# Patient Record
Sex: MALE | Race: WHITE | Employment: FULL TIME | URBAN - METROPOLITAN AREA
[De-identification: names, ages, dates, MRNs, and addresses within clinical notes are randomized per-mention and may not be internally consistent; named-entity substitution may affect disease eponyms.]

---

## 2017-09-14 ENCOUNTER — OFFICE VISIT (OUTPATIENT)
Dept: FAMILY MEDICINE | Facility: CLINIC | Age: 52
End: 2017-09-14

## 2017-09-14 VITALS
BODY MASS INDEX: 27.47 KG/M2 | DIASTOLIC BLOOD PRESSURE: 70 MMHG | WEIGHT: 175 LBS | HEART RATE: 62 BPM | OXYGEN SATURATION: 98 % | SYSTOLIC BLOOD PRESSURE: 92 MMHG | TEMPERATURE: 98.9 F | HEIGHT: 67 IN

## 2017-09-14 DIAGNOSIS — Z13.220 ENCOUNTER FOR LIPID SCREENING FOR CARDIOVASCULAR DISEASE: ICD-10-CM

## 2017-09-14 DIAGNOSIS — Z00.00 ROUTINE GENERAL MEDICAL EXAMINATION AT A HEALTH CARE FACILITY: Primary | ICD-10-CM

## 2017-09-14 DIAGNOSIS — Z13.228 SCREENING FOR METABOLIC DISORDER: ICD-10-CM

## 2017-09-14 DIAGNOSIS — Z13.0 SCREENING, ANEMIA, DEFICIENCY, IRON: ICD-10-CM

## 2017-09-14 DIAGNOSIS — Z13.6 ENCOUNTER FOR LIPID SCREENING FOR CARDIOVASCULAR DISEASE: ICD-10-CM

## 2017-09-14 DIAGNOSIS — E56.9 VITAMIN DEFICIENCY: ICD-10-CM

## 2017-09-14 DIAGNOSIS — Z82.49 FHX: HEART DISEASE: ICD-10-CM

## 2017-09-14 DIAGNOSIS — D17.30 LIPOMA OF SKIN AND SUBCUTANEOUS TISSUE: ICD-10-CM

## 2017-09-14 DIAGNOSIS — Z23 VACCINE FOR VIRAL HEPATITIS: ICD-10-CM

## 2017-09-14 DIAGNOSIS — B07.8 COMMON WART: ICD-10-CM

## 2017-09-14 DIAGNOSIS — B07.0 PLANTAR WARTS: ICD-10-CM

## 2017-09-14 LAB
% GRANULOCYTES: 59.9 % (ref 42.2–75.2)
HCT VFR BLD AUTO: 45.4 % (ref 39–51)
HEMOGLOBIN: 15.5 G/DL (ref 13.4–17.5)
LYMPHOCYTES NFR BLD AUTO: 31.5 % (ref 20.5–51.1)
MCH RBC QN AUTO: 30.9 PG (ref 27–31)
MCHC RBC AUTO-ENTMCNC: 34.2 G/DL (ref 33–37)
MCV RBC AUTO: 90.3 FL (ref 80–100)
MONOCYTES NFR BLD AUTO: 8.6 % (ref 1.7–9.3)
PLATELET # BLD AUTO: 252 K/UL (ref 140–450)
RBC # BLD AUTO: 5.03 X10/CMM (ref 4.2–5.9)
WBC # BLD AUTO: 4.6 X10/CMM (ref 3.8–11)

## 2017-09-14 PROCEDURE — 36415 COLL VENOUS BLD VENIPUNCTURE: CPT | Performed by: FAMILY MEDICINE

## 2017-09-14 PROCEDURE — 86706 HEP B SURFACE ANTIBODY: CPT | Mod: 90 | Performed by: FAMILY MEDICINE

## 2017-09-14 PROCEDURE — 82306 VITAMIN D 25 HYDROXY: CPT | Mod: 90 | Performed by: FAMILY MEDICINE

## 2017-09-14 PROCEDURE — 99386 PREV VISIT NEW AGE 40-64: CPT | Mod: 25 | Performed by: FAMILY MEDICINE

## 2017-09-14 PROCEDURE — 86708 HEPATITIS A ANTIBODY: CPT | Mod: 90 | Performed by: FAMILY MEDICINE

## 2017-09-14 PROCEDURE — 80053 COMPREHEN METABOLIC PANEL: CPT | Mod: 90 | Performed by: FAMILY MEDICINE

## 2017-09-14 PROCEDURE — 17110 DESTRUCTION B9 LES UP TO 14: CPT | Performed by: FAMILY MEDICINE

## 2017-09-14 PROCEDURE — 85025 COMPLETE CBC W/AUTO DIFF WBC: CPT | Performed by: FAMILY MEDICINE

## 2017-09-14 PROCEDURE — 80061 LIPID PANEL: CPT | Mod: 90 | Performed by: FAMILY MEDICINE

## 2017-09-14 NOTE — LETTER
[facility]  [address] [place]  [address]  Phone: [phone]    September 18, 2017      Terrence Arrington  [address]  [facility] [address]              Dear Terrence,    The results from your recent visit showed   CMP/CBC were  ok   Lipids were slightly elevated   Vit D was fine   NOT IMMUNE TO HEPATITIS A/B consider vaccination series       Sincerely,     Kelsie Beasley M.D.    Results for orders placed or performed in visit on 09/14/17   Comp. Metabolic Panel (14) ([facility])   Result Value Ref Range    Glucose 88 65 - 99 mg/dL    Urea Nitrogen 13 6 - 24 mg/dL    Creatinine 0.76 0.76 - 1.27 mg/dL    eGFR If NonAfricn Am 104 >59 mL/min/1.73    eGFR If Africn Am 120 >59 mL/min/1.73    BUN/Creatinine Ratio 17 9 - 20    Sodium 143 134 - 144 mmol/L    Potassium 5.1 3.5 - 5.2 mmol/L    Chloride 100 96 - 106 mmol/L    Total CO2 23 18 - 28 mmol/L    Calcium 9.7 8.7 - 10.2 mg/dL    Protein Total 7.4 6.0 - 8.5 g/dL    Albumin 4.7 3.5 - 5.5 g/dL    Globulin, Total 2.7 1.5 - 4.5 g/dL    A/G Ratio 1.7 1.2 - 2.2    Bilirubin Total 0.8 0.0 - 1.2 mg/dL    Alkaline Phosphatase 43 39 - 117 IU/L    AST 26 0 - 40 IU/L    ALT 22 0 - 44 IU/L    Narrative    Performed at:  01 - [facility] [address], [place], [place]  [id number]  : Amadeo Trevino MD, Phone:  [phone]   Lipid Panel ([facility])   Result Value Ref Range    Cholesterol 239 (H) 100 - 199 mg/dL    Triglycerides 115 0 - 149 mg/dL    HDL Cholesterol 72 >39 mg/dL    VLDL Cholesterol Toney 23 5 - 40 mg/dL    LDL Cholesterol Calculated 144 (H) 0 - 99 mg/dL    LDL/HDL Ratio 2.0 0.0 - 3.6 ratio units    Narrative    Performed at:  01 - [facility] [address], [place], [place]  [id number]  : Amadeo Trevino MD, Phone:  [phone]   CBC with Diff/Plt ([facility])   Result Value Ref Range    WBC x10/cmm 4.6 3.8 - 11.0 x10/cmm    % Lymphocytes 31.5 20.5 - 51.1 %    % Monocytes 8.6 1.7 - 9.3 %    % Granulocytes 59.9 42.2 - 75.2 %     RBC x10/cmm 5.03 4.2 - 5.9 x10/cmm    Hemoglobin 15.5 13.4 - 17.5 g/dl    Hematocrit 45.4 39 - 51 %    MCV 90.3 80 - 100 fL    MCH 30.9 27.0 - 31.0 pg    MCHC 34.2 33.0 - 37.0 g/dL    Platelet Count 252 140 - 450 K/uL   Vitamin D  25-Hydroxy ([facility])   Result Value Ref Range    Vitamin D,25-Hydroxy 53.5 30.0 - 100.0 ng/mL    Narrative    Performed at:  01 - [facility] [address], [place], [place]  [id number]  : Amadeo Trevino MD, Phone:  [phone]   Hep A Ab  Total ([facility])   Result Value Ref Range    Hep A Ab, Total Negative Negative    Narrative    Performed at:  01 - [facility] [address], [place], [place]  [id number]  : Amadeo Trevino MD, Phone:  [phone]   Hep B Surface Ab ([facility])   Result Value Ref Range    Hep B Surface Riya Qual Non Reactive     Narrative    Performed at:  01 - [facility] [address], [place], [place]  [id number]  : Amadeo Trevino MD, Phone:  [phone]

## 2017-09-14 NOTE — PATIENT INSTRUCTIONS
Preventive Health Recommendations  Male Ages 50   64    Yearly exam:             See your health care provider every year in order to  o   Review health changes.   o   Discuss preventive care.    o   Review your medicines if your doctor has prescribed any.     Have a cholesterol test every 5 years, or more frequently if you are at risk for high cholesterol/heart disease.     Have a diabetes test (fasting glucose) every three years. If you are at risk for diabetes, you should have this test more often.     Have a colonoscopy at age 50, or have a yearly FIT test (stool test). These exams will check for colon cancer.      Talk with your health care provider about whether or not a prostate cancer screening test (PSA) is right for you.    You should be tested each year for STDs (sexually transmitted diseases), if you re at risk.     Shots: Get a flu shot each year. Get a tetanus shot every 10 years.     Nutrition:    Eat at least 5 servings of fruits and vegetables daily.     Eat whole-grain bread, whole-wheat pasta and brown rice instead of white grains and rice.     Talk to your provider about Calcium and Vitamin D.     Lifestyle    Exercise for at least 150 minutes a week (30 minutes a day, 5 days a week). This will help you control your weight and prevent disease.     Limit alcohol to one drink per day.     No smoking.     Wear sunscreen to prevent skin cancer.     See your dentist every six months for an exam and cleaning.     See your eye doctor every 1 to 2 years.  Cryo for warts today    Eye exam update    You declined vaccine today    Labs today    Sign up for Alex

## 2017-09-14 NOTE — MR AVS SNAPSHOT
After Visit Summary   9/14/2017    Terrence Arrington    MRN: [id number]           Patient Information     Date Of Birth          1965        Visit Information        Provider Department      9/14/2017 9:15 AM Kelsie Beasley MD [facility]        Today's Diagnoses     Routine general medical examination at a health care facility    -  1    Screening for metabolic disorder        FHx: heart disease        Encounter for lipid screening for cardiovascular disease        Screening, anemia, deficiency, iron        Plantar warts        Common wart        Lipoma of skin and subcutaneous tissue        Vitamin deficiency        Vaccine for viral hepatitis          Care Instructions      Preventive Health Recommendations  Male Ages 50 - 64    Yearly exam:             See your health care provider every year in order to  o   Review health changes.   o   Discuss preventive care.    o   Review your medicines if your doctor has prescribed any.     Have a cholesterol test every 5 years, or more frequently if you are at risk for high cholesterol/heart disease.     Have a diabetes test (fasting glucose) every three years. If you are at risk for diabetes, you should have this test more often.     Have a colonoscopy at age 50, or have a yearly FIT test (stool test). These exams will check for colon cancer.      Talk with your health care provider about whether or not a prostate cancer screening test (PSA) is right for you.    You should be tested each year for STDs (sexually transmitted diseases), if you re at risk.     Shots: Get a flu shot each year. Get a tetanus shot every 10 years.     Nutrition:    Eat at least 5 servings of fruits and vegetables daily.     Eat whole-grain bread, whole-wheat pasta and brown rice instead of white grains and rice.     Talk to your provider about Calcium and Vitamin D.     Lifestyle    Exercise for at least 150 minutes a week (30 minutes a day, 5 days a week). This  "will help you control your weight and prevent disease.     Limit alcohol to one drink per day.     No smoking.     Wear sunscreen to prevent skin cancer.     See your dentist every six months for an exam and cleaning.     See your eye doctor every 1 to 2 years.  Cryo for warts today    Eye exam update    You declined vaccine today    Labs today    Sign up for Broadersheet          Follow-ups after your visit        Who to contact     If you have questions or need follow up information about today's clinic visit or your schedule please contact [facility] directly at [phone].  Normal or non-critical lab and imaging results will be communicated to you by T4 Mediahart, letter or phone within 4 business days after the clinic has received the results. If you do not hear from us within 7 days, please contact the clinic through iOculit or phone. If you have a critical or abnormal lab result, we will notify you by phone as soon as possible.  Submit refill requests through Broadersheet or call your pharmacy and they will forward the refill request to us. Please allow 3 business days for your refill to be completed.          Additional Information About Your Visit        MyCharSmarterphone Information     Broadersheet lets you send messages to your doctor, view your test results, renew your prescriptions, schedule appointments and more. To sign up, go to [email or web address] . Click on \"Log in\" on the left side of the screen, which will take you to the Welcome page. Then click on \"Sign up Now\" on the right side of the page.     You will be asked to enter the access code listed below, as well as some personal information. Please follow the directions to create your username and password.     Your access code is: [email or web address]  Expires: 2017 10:12 AM     Your access code will  in 90 days. If you need help or a new code, please call your [facility] or [phone].        Care EveryWhere ID     This is your Care EveryWhere " "ID. This could be used by other organizations to access your [facility] medical records  [id number]        Your Vitals Were     Pulse Temperature Height Pulse Oximetry BMI (Body Mass Index)       62 98.9  F (37.2  C) 1.695 m (5' 6.75\") 98% 27.61 kg/m2        Blood Pressure from Last 3 Encounters:   09/14/17 92/70    Weight from Last 3 Encounters:   09/14/17 79.4 kg (175 lb)              We Performed the Following     CBC with Diff/Plt ([facility])     Comp. Metabolic Panel (14) ([facility])     DESTRUCT BENIGN LESION, UP TO 14     Hep A Ab  Total ([facility])     Hep B Surface Ab ([facility])     Lipid Panel ([facility])     Vitamin D  25-Hydroxy ([facility])        Primary Care Provider Office Phone # Fax #    Kelsie Beasley [phone] [address] [facility] [facility] [address]        Equal Access to Services     [facility]: Hadii aad ku hadasho Soomaali, waaxda luqadaha, qaybta kaalmada adeegyada, waxay idiin hayaan jovon escalante . So [facility] [phone].    ATENCIÓN: Si habla español, tiene a calles disposición servicios gratuitos de asistencia lingüística. Llame al [phone].    We comply with applicable federal civil rights laws and [place] laws. We do not discriminate on the basis of race, color, national origin, age, disability sex, sexual orientation or gender identity.            Thank you!     Thank you for choosing [facility]  for your care. Our goal is always to provide you with excellent care. Hearing back from our patients is one way we can continue to improve our services. Please take a few minutes to complete the written survey that you may receive in the mail after your visit with us. Thank you!             Your Updated Medication List - Protect others around you: Learn how to safely use, store and throw away your medicines at www.disposemymeds.org.      Notice  As of 9/14/2017 10:12 AM    You have not been prescribed any medications.      "

## 2017-09-14 NOTE — PROGRESS NOTES
NEW PATIENT  SUBJECTIVE:   CC: Terrence Arrington is an 52 year old male who presents for preventative health visit.     Healthy Habits:    Do you get at least three servings of calcium containing foods daily (dairy, green leafy vegetables, etc.)? yes    Amount of exercise or daily activities, outside of work: 6 day(s) per week    Problems taking medications regularly not applicable    Medication side effects: No    Have you had an eye exam in the past two years? no    Do you see a dentist twice per year? yes    Do you have sleep apnea, excessive snoring or daytime drowsiness?no    New patient establish care  Previous care site: Colonoscopy 9 years ago [address]. Negative.   Last Td was on 8/4/2009     Terrence Arrington is a 52 year old male who is here to establish care and physical.  White male with glasses    How did you hear about our clinic? Insurance    Job /serve   female  No kid  2 dogs one cat  Hobbies: banjo, training for ninja warrior, reading, walking the dogs. Cut down on TV    Smoker rare cigar, quit chewing tobacco 3 years ago  ETOH 12 pack/week beer. 5/setting. (reviewed 3/day recommendation)  Street drug/MJ : MJ one a month  Caffeine: coffee 1/2 pot 12 cups, no soda    Sleep fine 6-8 hours, Nocturia 1  Appetite fine  Exercise as above    Travel: no plans  Exposures: no    Eye 3 years ago  Dental UTD 4 months ago    Concerns today: none        MENTAL STATUS EXAM:  Appearance: Appears stated age, dressed and groomed appropriately for the visit today.  Attitude: cooperative  Behavior: normal  Eye Contact: normal  Speech: normal  Orientation: oriented to person , place, time and situation  Mood:  Admits no sadness and anxiety most of time  Affect: Mood Congruent  Thought Process: clear  Suicidal Ideation: reports no thoughts, no intention  Hallucination: no  Paranoid-no  Manic-no  Panic-no  OCD - no  Gambling - no        Recent falls - no  Recent blackouts - no  Seizures - no        Any  ER/UC or hospital visits within the last 2 weeks? - no        ADLs:    Self care: independent all  Toileting -   Bathing -   Hair-   Teeth-   Feeding-     Housekeeping:independent  Grocery shopping-   Vacuuming-   Picking up-   Laundry-     Transportation:  Walk- ok  Bike- ok sometimes helmet  Bus- no  Drive- yes  Accidents- no    Sit- no issues  Stand- no issues  Walk- no issues  Lying- no issues      Vaccines:   Adacel- 2009  Flu- no, declined  Pneumovax- no declined  Gardisil- NA  Hep A/B- no. declined  Chicken pox- had disease  Zostavax (age 60 and over)- NA                          CONCERNS  Spot/Lump mid right side of back- lipoma    Bump on back  Left finger wart  Right foot warts  Hemorrhoids  Right great toenail.    Today's PHQ-2 Score: PHQ-2 ( 1999 Pfizer) 9/14/2017   Q1: Little interest or pleasure in doing things 0   Q2: Feeling down, depressed or hopeless 0   PHQ-2 Score 0         Abuse: Current or Past(Physical, Sexual or Emotional)- No  Do you feel safe in your environment - Yes  Social History   Substance Use Topics     Smoking status: Current Some Day Smoker     Smokeless tobacco: Never Used      Comment: Occasionally weed and cigars      Alcohol use Yes     The patient does not drink >3 drinks per day nor >7 drinks per week.    Last PSA: No results found for: PSA    Reviewed orders with patient. Reviewed health maintenance and updated orders accordingly - Yes  Labs reviewed in EPIC  BP Readings from Last 3 Encounters:   09/14/17 92/70    Wt Readings from Last 3 Encounters:   09/14/17 79.4 kg (175 lb)                  Patient Active Problem List   Diagnosis     Plantar warts     Common wart     Lipoma of skin and subcutaneous tissue     Past Surgical History:   Procedure Laterality Date     APPENDECTOMY       HERNIORRHAPHY INGUINAL CHILD BILATERAL       LASIK BILATERAL Bilateral      VASECTOMY         Social History   Substance Use Topics     Smoking status: Current Some Day Smoker     Types: Cigars  "    Smokeless tobacco: Never Used      Comment: Occasionally weed and cigars      Alcohol use Yes     Family History   Problem Relation Age of Onset     Leukemia Mother 56     Other - See Comments Father 90     Cardiac stents, Smoker     CANCER Maternal Grandmother      Dementia Paternal Grandmother 80         No current outpatient prescriptions on file.     No Known Allergies  Recent Labs   Lab Test  09/14/17   1025   LDL  144*   HDL  72   TRIG  115   ALT  22   CR  0.76   POTASSIUM  5.1              Reviewed and updated as needed this visit by clinical staffTobacco  Allergies  Meds  Med Hx  Surg Hx  Soc Hx        Reviewed and updated as needed this visit by Provider        Past Medical History:   Diagnosis Date     Fracture of right foot     x2      Past Surgical History:   Procedure Laterality Date     APPENDECTOMY       HERNIORRHAPHY INGUINAL CHILD BILATERAL       LASIK BILATERAL Bilateral      VASECTOMY         ROS:  C: NEGATIVE for fever, chills, change in weight  I: NEGATIVE for worrisome rashes, moles or lesions  E: NEGATIVE for vision changes or irritation  ENT: NEGATIVE for ear, mouth and throat problems  R: NEGATIVE for significant cough or SOB  CV: NEGATIVE for chest pain, palpitations or peripheral edema  GI: NEGATIVE for nausea, abdominal pain, heartburn, or change in bowel habits   male: negative for dysuria, hematuria, decreased urinary stream, erectile dysfunction, urethral discharge  M: NEGATIVE for significant arthralgias or myalgia  N: NEGATIVE for weakness, dizziness or paresthesias  P: NEGATIVE for changes in mood or affect    OBJECTIVE:   Ht 1.695 m (5' 6.75\")  Wt 79.4 kg (175 lb)  BMI 27.61 kg/m2  EXAM:  GENERAL: healthy, alert and no distress  EYES: Eyes grossly normal to inspection, PERRL and conjunctivae and sclerae normal  HENT: ear canals and TM's normal, nose and mouth without ulcers or lesions  NECK: no adenopathy, no asymmetry, masses, or scars and thyroid normal to " palpation  RESP: lungs clear to auscultation - no rales, rhonchi or wheezes  CV: regular rate and rhythm, normal S1 S2, no S3 or S4, no murmur, click or rub, no peripheral edema and peripheral pulses strong  ABDOMEN: soft, nontender, no hepatosplenomegaly, no masses and bowel sounds normal   (male): normal male genitalia without lesions or urethral discharge, no hernia  RECTAL: normal sphincter tone, no rectal masses, prostate normal size, smooth, nontender without nodules or masses  MS: no gross musculoskeletal defects noted, no edema  SKIN: no suspicious lesions or rashes Lipoma and warts as below  NEURO: Normal strength and tone, mentation intact and speech normal  PSYCH: mentation appears normal, affect normal/bright  LYMPH: no cervical, supraclavicular, axillary, or inguinal adenopathy  Warts left index and right plantar- cryo done at his request  Right great toe nail (cinder block feel on it) healing    ASSESSMENT/PLAN:       ICD-10-CM    1. Routine general medical examination at a health care facility Z00.00    2. Screening for metabolic disorder Z13.228 Comp. Metabolic Panel (14) ([facility])   3. FHx: heart disease Z82.49    4. Encounter for lipid screening for cardiovascular disease Z13.220 Lipid Panel ([facility])    Z13.6    5. Screening, anemia, deficiency, iron Z13.0 CBC with Diff/Plt ([facility])   6. Plantar warts B07.0 DESTRUCT BENIGN LESION, UP TO 14   7. Common wart B07.8 DESTRUCT BENIGN LESION, UP TO 14   8. Lipoma of skin and subcutaneous tissue D17.30    9. Vitamin deficiency E56.9 Vitamin D  25-Hydroxy ([facility])   10. Vaccine for viral hepatitis Z23 Hep A Ab  Total ([facility])     Hep B Surface Ab ([facility])       COUNSELING:  Reviewed preventive health counseling, as reflected in patient instructions       Consider AAA screening for ages 65-75 and smoking history       Regular exercise       Healthy diet/nutrition       Vision screening       Immunizations    Declined: Influenza, Pneumococcal and TDAP      "         One time pneumovax for smokers       Advance Care Planning           reports that he has been smoking.  He has never used smokeless tobacco.  Tobacco Cessation Action Plan: Information offered: Patient not interested at this time  Estimated body mass index is 27.61 kg/(m^2) as calculated from the following:    Height as of this encounter: 1.695 m (5' 6.75\").    Weight as of this encounter: 79.4 kg (175 lb).   Weight management plan: Discussed healthy diet and exercise guidelines and patient will follow up in 12 months in clinic to re-evaluate.    Counseling Resources:  ATP IV Guidelines  Pooled Cohorts Equation Calculator  FRAX Risk Assessment  ICSI Preventive Guidelines  Dietary Guidelines for Americans, 2010  USDA's MyPlate  ASA Prophylaxis  Lung CA Screening    Kelsie Beasley MD  [facility]      "

## 2017-09-15 LAB
ALBUMIN SERPL-MCNC: 4.7 G/DL (ref 3.5–5.5)
ALBUMIN/GLOB SERPL: 1.7 {RATIO} (ref 1.2–2.2)
ALP SERPL-CCNC: 43 IU/L (ref 39–117)
ALT SERPL-CCNC: 22 IU/L (ref 0–44)
AST SERPL-CCNC: 26 IU/L (ref 0–40)
BILIRUB SERPL-MCNC: 0.8 MG/DL (ref 0–1.2)
BUN SERPL-MCNC: 13 MG/DL (ref 6–24)
BUN/CREATININE RATIO: 17 (ref 9–20)
CALCIUM SERPL-MCNC: 9.7 MG/DL (ref 8.7–10.2)
CHLORIDE SERPLBLD-SCNC: 100 MMOL/L (ref 96–106)
CHOLEST SERPL-MCNC: 239 MG/DL (ref 100–199)
CREAT SERPL-MCNC: 0.76 MG/DL (ref 0.76–1.27)
EGFR IF AFRICN AM: 120 ML/MIN/1.73
EGFR IF NONAFRICN AM: 104 ML/MIN/1.73
GLOBULIN, TOTAL: 2.7 G/DL (ref 1.5–4.5)
GLUCOSE SERPL-MCNC: 88 MG/DL (ref 65–99)
HDLC SERPL-MCNC: 72 MG/DL
HEP A AB, TOTAL: NEGATIVE
HEP B SURFACE ABY QUAL: NON REACTIVE
LDL/HDL RATIO: 2 RATIO UNITS (ref 0–3.6)
LDLC SERPL CALC-MCNC: 144 MG/DL (ref 0–99)
POTASSIUM SERPL-SCNC: 5.1 MMOL/L (ref 3.5–5.2)
PROT SERPL-MCNC: 7.4 G/DL (ref 6–8.5)
SODIUM SERPL-SCNC: 143 MMOL/L (ref 134–144)
TOTAL CO2: 23 MMOL/L (ref 18–28)
TRIGL SERPL-MCNC: 115 MG/DL (ref 0–149)
VITAMIN D, 25-HYDROXY: 53.5 NG/ML (ref 30–100)
VLDLC SERPL CALC-MCNC: 23 MG/DL (ref 5–40)

## 2017-09-19 PROBLEM — D17.30 LIPOMA OF SKIN AND SUBCUTANEOUS TISSUE: Status: ACTIVE | Noted: 2017-09-19

## 2017-09-19 PROBLEM — B07.8 COMMON WART: Status: ACTIVE | Noted: 2017-09-19

## 2017-09-19 PROBLEM — B07.0 PLANTAR WARTS: Status: ACTIVE | Noted: 2017-09-19

## 2018-02-01 ENCOUNTER — TELEPHONE (OUTPATIENT)
Dept: FAMILY MEDICINE | Facility: CLINIC | Age: 53
End: 2018-02-01

## 2018-02-01 NOTE — LETTER
[facility] [address] [place] [id number] [phone]      February 13, 2018      Terrence Arrington  [address]  [facility] [address]              Dear Terrence,      ACTION REQUIRED    You and I are failing at something critical, screening you for colon cancer!    Colon Cancer Screening- Recommended every 5-10 years, depending on your history, in order to prevent and detect colon cancer at its earliest stages.  Colon cancer is now the second leading cause of death in the [place] for both men and women and there are over 130,000 new cases and 50,000 deaths per year from colon cancer.  Colonoscopies can prevent 90-95% of these deaths.  Problem lesions can be removed before they ever become cancer.  This test is not only looking for cancer, but also getting rid of precancerious lesions.  You are usually given some sedation which makes the test very comfortable for most people.      If you do not wish to do a colonoscopy or cannot afford to do one, and you have no family members with colon cancer and you have had no previous polyps, at this time, there is another option. It is called a Cologuard test (take home stool sample kit).  It does need to be repeated every three years and if a positive result is obtained, you would be referred for a colonoscopy. The FIT test is really easy to do and does not require any  diet or medication restrictions and involves only one collection sample.      If you have completed either one of these tests or had a flexible sigmoidoscopy in the past five years at another facility, please have the records sent to our clinic so that we can best coordinate your care.  Please call us ([phone]) if you have questions or would like arrange either to do a colonoscopy or obtain the necessary test kit for the Cologuard test    Please contact our office to discuss your colon cancer screening options.    Thanks!  Sincerely,  Kelsie Beasley M.D.

## 2018-02-01 NOTE — TELEPHONE ENCOUNTER
Left message to call back [facility] [place] [facility]  February 1, 2018  ANTWON Mireles contact   For insurance coverage  Or questions on directions  Call [phone]  Marisa Cabello MA

## 2019-12-06 ENCOUNTER — OFFICE VISIT (OUTPATIENT)
Dept: FAMILY MEDICINE | Facility: CLINIC | Age: 54
End: 2019-12-06

## 2019-12-06 VITALS
WEIGHT: 180.6 LBS | OXYGEN SATURATION: 97 % | SYSTOLIC BLOOD PRESSURE: 150 MMHG | DIASTOLIC BLOOD PRESSURE: 88 MMHG | HEIGHT: 68 IN | RESPIRATION RATE: 16 BRPM | BODY MASS INDEX: 27.37 KG/M2 | HEART RATE: 71 BPM

## 2019-12-06 DIAGNOSIS — Z13.220 SCREENING FOR LIPOID DISORDERS: ICD-10-CM

## 2019-12-06 DIAGNOSIS — Z13.1 SCREENING FOR DIABETES MELLITUS: ICD-10-CM

## 2019-12-06 DIAGNOSIS — Z00.00 ROUTINE GENERAL MEDICAL EXAMINATION AT A HEALTH CARE FACILITY: Primary | ICD-10-CM

## 2019-12-06 PROCEDURE — 36415 COLL VENOUS BLD VENIPUNCTURE: CPT | Performed by: FAMILY MEDICINE

## 2019-12-06 PROCEDURE — 80053 COMPREHEN METABOLIC PANEL: CPT | Mod: 90 | Performed by: FAMILY MEDICINE

## 2019-12-06 PROCEDURE — 80061 LIPID PANEL: CPT | Mod: 90 | Performed by: FAMILY MEDICINE

## 2019-12-06 PROCEDURE — 99396 PREV VISIT EST AGE 40-64: CPT | Performed by: FAMILY MEDICINE

## 2019-12-06 ASSESSMENT — MIFFLIN-ST. JEOR: SCORE: 1624.73

## 2019-12-06 NOTE — LETTER
[facility] [address] [place]  [address]  Phone: [phone]    December 10, 2019      Terrence Arrington  [address]  [facility] [address]              Dear Terrence,    The results from your recent visit showed improved cholesterol results and reassuring labs.        Sincerely,     Sammy Sanderson M.D.    Results for orders placed or performed in visit on 12/06/19   Lipid Panel ([facility])     Status: Abnormal   Result Value Ref Range    Cholesterol 209 (H) 100 - 199 mg/dL    Triglycerides 98 0 - 149 mg/dL    HDL Cholesterol 64 >39 mg/dL    VLDL Cholesterol Toney 20 5 - 40 mg/dL    LDL Cholesterol Calculated 125 (H) 0 - 99 mg/dL    LDL/HDL Ratio 2.0 0.0 - 3.6 ratio   Comp. Metabolic Panel (14) ([facility])     Status: Abnormal   Result Value Ref Range    Glucose 97 65 - 99 mg/dL    Urea Nitrogen 11 6 - 24 mg/dL    Creatinine 0.65 (L) 0.76 - 1.27 mg/dL    eGFR If NonAfricn Am 110 >59 mL/min/1.73    eGFR If Africn Am 127 >59 mL/min/1.73    BUN/Creatinine Ratio 17 9 - 20    Sodium 141 134 - 144 mmol/L    Potassium 4.4 3.5 - 5.2 mmol/L    Chloride 105 96 - 106 mmol/L    Total CO2 25 20 - 29 mmol/L    Calcium 9.4 8.7 - 10.2 mg/dL    Protein Total 6.7 6.0 - 8.5 g/dL    Albumin 4.5 3.5 - 5.5 g/dL    Globulin, Total 2.2 1.5 - 4.5 g/dL    A/G Ratio 2.0 1.2 - 2.2    Bilirubin Total 0.6 0.0 - 1.2 mg/dL    Alkaline Phosphatase 39 39 - 117 IU/L    AST 22 0 - 40 IU/L    ALT 22 0 - 44 IU/L

## 2019-12-06 NOTE — PROGRESS NOTES
3  SUBJECTIVE:   CC: Terrence Arrington is an 54 year old male who presents for preventive health visit.     Healthy Habits:    Do you get at least three servings of calcium containing foods daily (dairy, green leafy vegetables, etc.)? yes    Amount of exercise or daily activities, outside of work: 6 day(s) per week    Problems taking medications regularly No    Medication side effects: No    Have you had an eye exam in the past two years? yes    Do you see a dentist twice per year?no-once a year    Do you have sleep apnea, excessive snoring or daytime drowsiness?no    Pass hearing test for both ears    Today's PHQ-2 Score:   PHQ-2 ( 1999 Pfizer) 9/14/2017   Q1: Little interest or pleasure in doing things 0   Q2: Feeling down, depressed or hopeless 0   PHQ-2 Score 0       Abuse: Current or Past(Physical, Sexual or Emotional)- No  Do you feel safe in your environment? Yes    Have you ever done Advance Care Planning? (For example, a Health Directive, POLST, or a discussion with a medical provider or your loved ones about your wishes): no    Social History     Tobacco Use     Smoking status: Current Some Day Smoker     Types: Cigars     Smokeless tobacco: Never Used     Tobacco comment: Occasionally weed and cigars    Substance Use Topics     Alcohol use: Yes     If you drink alcohol do you typically have >3 drinks per day or >7 drinks per week? No                      Last PSA: No results found for: PSA    Reviewed orders with patient. Reviewed health maintenance and updated orders accordingly - Yes  Lab work is in process    Reviewed and updated as needed this visit by clinical staff  Tobacco  Allergies  Meds         Reviewed and updated as needed this visit by Provider            ROS:  CONSTITUTIONAL: NEGATIVE for fever, chills, change in weight  INTEGUMENTARY/SKIN: NEGATIVE for worrisome rashes, moles or lesions  EYES: NEGATIVE for vision changes or irritation  ENT: NEGATIVE for ear, mouth and throat  "problems  RESP: NEGATIVE for significant cough or SOB  CV: NEGATIVE for chest pain, palpitations or peripheral edema  GI: NEGATIVE for nausea, abdominal pain, heartburn, or change in bowel habits   male: negative for dysuria, hematuria, decreased urinary stream, erectile dysfunction, urethral discharge  MUSCULOSKELETAL: NEGATIVE for significant arthralgias or myalgia  NEURO: NEGATIVE for weakness, dizziness or paresthesias  PSYCHIATRIC: NEGATIVE for changes in mood or affect    OBJECTIVE:   BP (!) 150/88   Pulse 71   Resp 16   Ht 1.721 m (5' 7.75\")   Wt 81.9 kg (180 lb 9.6 oz)   SpO2 97%   BMI 27.66 kg/m    EXAM:  GENERAL: healthy, alert and no distress  EYES: Eyes grossly normal to inspection, PERRL and conjunctivae and sclerae normal  HENT: ear canals and TM's normal, nose and mouth without ulcers or lesions  NECK: no adenopathy, no asymmetry, masses, or scars and thyroid normal to palpation  RESP: lungs clear to auscultation - no rales, rhonchi or wheezes  CV: regular rate and rhythm, normal S1 S2, no S3 or S4, no murmur, click or rub, no peripheral edema and peripheral pulses strong  ABDOMEN: soft, nontender, no hepatosplenomegaly, no masses and bowel sounds normal  MS: no gross musculoskeletal defects noted, no edema  SKIN: no suspicious lesions or rashes  NEURO: Normal strength and tone, mentation intact and speech normal  PSYCH: mentation appears normal, affect normal/bright    Diagnostic Test Results:  Labs reviewed in Epic    ASSESSMENT/PLAN:       ICD-10-CM    1. Routine general medical examination at a health care facility Z00.00    2. Screening for lipoid disorders Z13.220 Lipid Panel ([facility])   3. Screening for diabetes mellitus Z13.1 Comp. Metabolic Panel (14) ([facility])       COUNSELING:  Reviewed preventive health counseling, as reflected in patient instructions  Special attention given to:        Regular exercise       Healthy diet/nutrition       Vision screening       Hearing " "screening    Estimated body mass index is 27.61 kg/m  as calculated from the following:    Height as of 9/14/17: 1.695 m (5' 6.75\").    Weight as of 9/14/17: 79.4 kg (175 lb).     reports that he has been smoking cigars. He has never used smokeless tobacco.    Pt will monitor BP at home closely and let us know if over 140 consitently    Counseling Resources:  ATP IV Guidelines  Pooled Cohorts Equation Calculator  FRAX Risk Assessment  ICSI Preventive Guidelines  Dietary Guidelines for Americans, 2010  USDA's MyPlate  ASA Prophylaxis  Lung CA Screening    Sammy Sanderson MD  [facility]  "

## 2019-12-07 LAB
ALBUMIN SERPL-MCNC: 4.5 G/DL (ref 3.5–5.5)
ALBUMIN/GLOB SERPL: 2 {RATIO} (ref 1.2–2.2)
ALP SERPL-CCNC: 39 IU/L (ref 39–117)
ALT SERPL-CCNC: 22 IU/L (ref 0–44)
AST SERPL-CCNC: 22 IU/L (ref 0–40)
BILIRUB SERPL-MCNC: 0.6 MG/DL (ref 0–1.2)
BUN SERPL-MCNC: 11 MG/DL (ref 6–24)
BUN/CREATININE RATIO: 17 (ref 9–20)
CALCIUM SERPL-MCNC: 9.4 MG/DL (ref 8.7–10.2)
CHLORIDE SERPLBLD-SCNC: 105 MMOL/L (ref 96–106)
CHOLEST SERPL-MCNC: 209 MG/DL (ref 100–199)
CREAT SERPL-MCNC: 0.65 MG/DL (ref 0.76–1.27)
EGFR IF AFRICN AM: 127 ML/MIN/1.73
EGFR IF NONAFRICN AM: 110 ML/MIN/1.73
GLOBULIN, TOTAL: 2.2 G/DL (ref 1.5–4.5)
GLUCOSE SERPL-MCNC: 97 MG/DL (ref 65–99)
HDLC SERPL-MCNC: 64 MG/DL
LDL/HDL RATIO: 2 RATIO (ref 0–3.6)
LDLC SERPL CALC-MCNC: 125 MG/DL (ref 0–99)
POTASSIUM SERPL-SCNC: 4.4 MMOL/L (ref 3.5–5.2)
PROT SERPL-MCNC: 6.7 G/DL (ref 6–8.5)
SODIUM SERPL-SCNC: 141 MMOL/L (ref 134–144)
TOTAL CO2: 25 MMOL/L (ref 20–29)
TRIGL SERPL-MCNC: 98 MG/DL (ref 0–149)
VLDLC SERPL CALC-MCNC: 20 MG/DL (ref 5–40)

## 2019-12-24 DIAGNOSIS — Z12.11 SPECIAL SCREENING FOR MALIGNANT NEOPLASMS, COLON: Primary | ICD-10-CM

## 2021-06-08 NOTE — PROGRESS NOTES
SUBJECTIVE:   CC: Terrence Arringotn is an 56 year old male who presents for preventive health visit.     Patient has been advised of split billing requirements and indicates understanding: Yes     He is very active.  No concerns.    Healthy Habits:    Do you get at least three servings of calcium containing foods daily (dairy, green leafy vegetables, etc.)? no, taking calcium and/or vitamin D supplement: yes     Amount of exercise or daily activities, outside of work: 6 day(s) per week    Problems taking medications regularly No    Medication side effects: No    Have you had an eye exam in the past two years? No     Do you see a dentist twice per year? No, once per year.    Do you have sleep apnea, excessive snoring or daytime drowsiness? No     Hearing Screening:   Right Ear  4000Hz: pass  2000Hz: pass  1000Hz: pass  500Hz: pass    Left Ear  4000Hz: pass  2000Hz: pass  1000Hz: pass  500Hz: pass        -------------------------------------    Today's PHQ-2 Score:   PHQ-2 ( 1999 Pfizer) 6/9/2021 12/6/2019   Q1: Little interest or pleasure in doing things 0 0   Q2: Feeling down, depressed or hopeless 0 0   PHQ-2 Score 0 0     Abuse: Current or Past(Physical, Sexual or Emotional)- NO  Do you feel safe in your environment? NO    Have you ever done Advance Care Planning? (For example, a Health Directive, POLST, or a discussion with a medical provider or your loved ones about your wishes): No, advance care planning information given to patient to review.  Patient plans to discuss their wishes with loved ones or provider.      Social History     Tobacco Use     Smoking status: Current Some Day Smoker     Types: Cigars     Smokeless tobacco: Never Used     Tobacco comment: Occasionally weed and cigars    Substance Use Topics     Alcohol use: Yes     Frequency: 2-4 times a month     Drinks per session: 3 or 4     Comment: Mostly socially     If you drink alcohol do you typically have >3 drinks per day or >7 drinks per  "week? No                      Last PSA: No results found for: PSA    Reviewed orders with patient. Reviewed health maintenance and updated orders accordingly - Yes  Lab work is in process    Reviewed and updated as needed this visit by clinical staff  Tobacco  Allergies  Meds              Reviewed and updated as needed this visit by Provider                Past Medical History:   Diagnosis Date     Fracture of right foot     x2      Past Surgical History:   Procedure Laterality Date     APPENDECTOMY       HERNIORRHAPHY INGUINAL CHILD BILATERAL       LASIK BILATERAL Bilateral      VASECTOMY         ROS:  CONSTITUTIONAL: NEGATIVE for fever, chills, change in weight  INTEGUMENTARY/SKIN: NEGATIVE for worrisome rashes, moles or lesions  EYES: NEGATIVE for vision changes or irritation  ENT: NEGATIVE for ear, mouth and throat problems  RESP: NEGATIVE for significant cough or SOB  CV: NEGATIVE for chest pain, palpitations or peripheral edema  GI: NEGATIVE for nausea, abdominal pain, heartburn, or change in bowel habits   male: negative for dysuria, hematuria, decreased urinary stream, erectile dysfunction, urethral discharge  MUSCULOSKELETAL: NEGATIVE for significant arthralgias or myalgia  NEURO: NEGATIVE for weakness, dizziness or paresthesias  PSYCHIATRIC: NEGATIVE for changes in mood or affect    OBJECTIVE:   /61   Pulse 57   Ht 1.695 m (5' 6.75\")   Wt 80.7 kg (178 lb)   SpO2 98%   BMI 28.09 kg/m    EXAM:  GENERAL: healthy, alert and no distress  EYES: Eyes grossly normal to inspection, PERRL and conjunctivae and sclerae normal  HENT: ear canals and TM's normal, nose and mouth without ulcers or lesions  NECK: no adenopathy, no asymmetry, masses, or scars and thyroid normal to palpation  RESP: lungs clear to auscultation - no rales, rhonchi or wheezes  CV: regular rate and rhythm, normal S1 S2, no S3 or S4, no murmur, click or rub, no peripheral edema and peripheral pulses strong  ABDOMEN: soft, " "nontender, no hepatosplenomegaly, no masses and bowel sounds normal  RECTAL: normal sphincter tone, no rectal masses, prostate normal size, smooth, nontender without nodules or masses  MS: no gross musculoskeletal defects noted, no edema  SKIN: no suspicious lesions or rashes  NEURO: Normal strength and tone, mentation intact and speech normal  PSYCH: mentation appears normal, affect normal/bright    Diagnostic Test Results:  Labs reviewed in Epic    ASSESSMENT/PLAN:   1. Need for vaccination  - TD PRESERV FREE, IM (7+ YRS)  - VACCINE ADMINISTRATION, INITIAL    2. Routine general medical examination at a health care facility  - discussed preventative guidelines, healthy diet, exercise and weight management    3. Screening for lipoid disorders  - Lipid Panel ([facility])    4. Encounter for screening for metabolic disorder  - Comp. Metabolic Panel (14) ([facility])    5. Encounter for screening for hematologic disorder  - CBC with Diff/Plt ([facility])    6. Need for hepatitis C screening test  - HCV Antibody ([facility])    Patient has been advised of split billing requirements and indicates understanding: Yes  COUNSELING:  Reviewed preventive health counseling, as reflected in patient instructions    Estimated body mass index is 28.09 kg/m  as calculated from the following:    Height as of this encounter: 1.695 m (5' 6.75\").    Weight as of this encounter: 80.7 kg (178 lb).        He reports that he has been smoking cigars. He has never used smokeless tobacco.  Tobacco Cessation Action Plan:   n/a      Counseling Resources:  ATP IV Guidelines  Pooled Cohorts Equation Calculator  FRAX Risk Assessment  ICSI Preventive Guidelines  Dietary Guidelines for Americans, 2010  USDA's MyPlate  ASA Prophylaxis  Lung CA Screening    John Lee MD  [facility]  "

## 2021-06-08 NOTE — PATIENT INSTRUCTIONS
Preventive Health Recommendations  Male Ages 50 - 64    Yearly exam:             See your health care provider every year in order to  o   Review health changes.   o   Discuss preventive care.    o   Review your medicines if your doctor has prescribed any.     Have a cholesterol test every 5 years, or more frequently if you are at risk for high cholesterol/heart disease.     Have a diabetes test (fasting glucose) every three years. If you are at risk for diabetes, you should have this test more often.     Have a colonoscopy at age 50, or have a yearly FIT test (stool test). These exams will check for colon cancer.      Talk with your health care provider about whether or not a prostate cancer screening test (PSA) is right for you.    You should be tested each year for STDs (sexually transmitted diseases), if you re at risk.     Shots: Get a flu shot each year. Get a tetanus shot every 10 years.     Nutrition:    Eat at least 5 servings of fruits and vegetables daily.     Eat whole-grain bread, whole-wheat pasta and brown rice instead of white grains and rice.     Get adequate Calcium and Vitamin D.     Lifestyle    Exercise for at least 150 minutes a week (30 minutes a day, 5 days a week). This will help you control your weight and prevent disease.     Limit alcohol to one drink per day.     No smoking.     Wear sunscreen to prevent skin cancer.     See your dentist every six months for an exam and cleaning.     See your eye doctor every 1 to 2 years.    Preventive Health Recommendations  Male Ages 50 - 64    Yearly exam:             See your health care provider every year in order to  o   Review health changes.   o   Discuss preventive care.    o   Review your medicines if your doctor has prescribed any.     Have a cholesterol test every 5 years, or more frequently if you are at risk for high cholesterol/heart disease.     Have a diabetes test (fasting glucose) every three years. If you are at risk for diabetes,  you should have this test more often.     Have a colonoscopy at age 50, or have a yearly FIT test (stool test). These exams will check for colon cancer.      Talk with your health care provider about whether or not a prostate cancer screening test (PSA) is right for you.    You should be tested each year for STDs (sexually transmitted diseases), if you re at risk.     Shots: Get a flu shot each year. Get a tetanus shot every 10 years.     Nutrition:    Eat at least 5 servings of fruits and vegetables daily.     Eat whole-grain bread, whole-wheat pasta and brown rice instead of white grains and rice.     Get adequate Calcium and Vitamin D.     Lifestyle    Exercise for at least 150 minutes a week (30 minutes a day, 5 days a week). This will help you control your weight and prevent disease.     Limit alcohol to one drink per day.     No smoking.     Wear sunscreen to prevent skin cancer.     See your dentist every six months for an exam and cleaning.     See your eye doctor every 1 to 2 years.  Thank you for coming in today! If you receive a survey via My Chart, mail or phone please let us know if there was anything you especially appreciated today or if there is any way we can improve our clinic. We value your input.     Likewise, we are working hard to attend to our digital reputation.    Please consider reviewing our clinic on [employer] and/or [employer] via the following links or QR codes:    [email or web address]                                          We truly appreciate you taking the time to do this.    General Information:    Today you had your appointment with John Lee MD  My hours are:    Monday 8 AM - 5 PM  Tuesday: 8 AM - 5 PM  Wednesday: 8 AM - 5 PM  Fridays: 8 AM - 5 PM    I am not in the office Thursdays. Therefore, non urgent calls received on Thursday will be addressed when I am back in the office on  Friday.    If lab work was done today as part of your evaluation you will generally be contacted via My Chart, mail, or phone with the results within 1-5 days. If there is an alarming result we will contact you by phone. Lab results come back at varying times, I generally wait until all labs are resulted before making comments on results. Please note labs are automatically released to My Chart once available.    If you need refills please contact your pharmacy. They will send a refill request to me to review. Please allow 3 business days for us to process all refill requests.    Please call or send a medical message with any questions or concerns.    Thank you for choosing [facility].  We truly appreciate you trusting us with your medical care.    Sincerely,    John Lee MD

## 2021-06-09 ENCOUNTER — OFFICE VISIT (OUTPATIENT)
Dept: FAMILY MEDICINE | Facility: CLINIC | Age: 56
End: 2021-06-09

## 2021-06-09 VITALS
HEART RATE: 57 BPM | WEIGHT: 178 LBS | HEIGHT: 67 IN | DIASTOLIC BLOOD PRESSURE: 61 MMHG | BODY MASS INDEX: 27.94 KG/M2 | OXYGEN SATURATION: 98 % | SYSTOLIC BLOOD PRESSURE: 115 MMHG

## 2021-06-09 DIAGNOSIS — Z13.228 ENCOUNTER FOR SCREENING FOR METABOLIC DISORDER: ICD-10-CM

## 2021-06-09 DIAGNOSIS — Z00.00 ROUTINE GENERAL MEDICAL EXAMINATION AT A HEALTH CARE FACILITY: Primary | ICD-10-CM

## 2021-06-09 DIAGNOSIS — Z13.0 ENCOUNTER FOR SCREENING FOR HEMATOLOGIC DISORDER: ICD-10-CM

## 2021-06-09 DIAGNOSIS — Z13.220 SCREENING FOR LIPOID DISORDERS: ICD-10-CM

## 2021-06-09 DIAGNOSIS — Z11.59 NEED FOR HEPATITIS C SCREENING TEST: ICD-10-CM

## 2021-06-09 DIAGNOSIS — Z23 NEED FOR VACCINATION: ICD-10-CM

## 2021-06-09 LAB
% GRANULOCYTES: 59.1 % (ref 42.2–75.2)
HCT VFR BLD AUTO: 42.1 % (ref 39–51)
HEMOGLOBIN: 14.8 G/DL (ref 13.4–17.5)
LYMPHOCYTES NFR BLD AUTO: 32.3 % (ref 20.5–51.1)
MCH RBC QN AUTO: 31 PG (ref 27–31)
MCHC RBC AUTO-ENTMCNC: 35.1 G/DL (ref 33–37)
MCV RBC AUTO: 88.2 FL (ref 80–100)
MONOCYTES NFR BLD AUTO: 8.6 % (ref 1.7–9.3)
PLATELET # BLD AUTO: 262 K/UL (ref 140–450)
RBC # BLD AUTO: 4.77 X10/CMM (ref 4.2–5.9)
WBC # BLD AUTO: 4.6 X10/CMM (ref 3.8–11)

## 2021-06-09 PROCEDURE — 36415 COLL VENOUS BLD VENIPUNCTURE: CPT | Performed by: FAMILY MEDICINE

## 2021-06-09 PROCEDURE — 85025 COMPLETE CBC W/AUTO DIFF WBC: CPT | Performed by: FAMILY MEDICINE

## 2021-06-09 PROCEDURE — 90714 TD VACC NO PRESV 7 YRS+ IM: CPT | Performed by: FAMILY MEDICINE

## 2021-06-09 PROCEDURE — 99396 PREV VISIT EST AGE 40-64: CPT | Mod: 25 | Performed by: FAMILY MEDICINE

## 2021-06-09 PROCEDURE — 90471 IMMUNIZATION ADMIN: CPT | Performed by: FAMILY MEDICINE

## 2021-06-09 SDOH — HEALTH STABILITY: MENTAL HEALTH: HOW OFTEN DO YOU HAVE 6 OR MORE DRINKS ON ONE OCCASION?: NOT ASKED

## 2021-06-09 SDOH — HEALTH STABILITY: MENTAL HEALTH: HOW MANY STANDARD DRINKS CONTAINING ALCOHOL DO YOU HAVE ON A TYPICAL DAY?: 3 OR 4

## 2021-06-09 SDOH — HEALTH STABILITY: MENTAL HEALTH: HOW OFTEN DO YOU HAVE A DRINK CONTAINING ALCOHOL?: 2-4 TIMES A MONTH

## 2021-06-09 ASSESSMENT — MIFFLIN-ST. JEOR: SCORE: 1587.06

## 2021-06-09 NOTE — LETTER
[facility]  [address] [place]  [address]  Phone: [phone]    June 14, 2021      Terrence Arrington  [address]  [place] [address]            Dear Terrence     Your recent lab results are within the expected range.  Everything looks great!     It was very nice seeing you.  If you have any questions, please contact our office.         Sincerely,     John Lee MD    Results for orders placed or performed in visit on 06/09/21   HCV Antibody ([facility])     Status: None   Result Value Ref Range    Hep C Virus Ab <0.1 0.0 - 0.9 s/co ratio    Narrative    Performed at:  01 - [facility] [place] [address], [place], [place]  [id number]  : Mark Swan MD, Phone:  [phone]   Lipid Panel ([facility])     Status: Abnormal   Result Value Ref Range    Cholesterol 172 100 - 199 mg/dL    Triglycerides 65 0 - 149 mg/dL    HDL Cholesterol 57 >39 mg/dL    VLDL Cholesterol Toney 13 5 - 40 mg/dL    LDL Cholesterol Calculated 102 (H) 0 - 99 mg/dL    LDL/HDL Ratio 1.8 0.0 - 3.6 ratio    Narrative    Performed at:  01 - [facility] [place] [address], [place], [place]  [id number]  : Mark Swan MD, Phone:  [phone]   Comp. Metabolic Panel (14) ([facility])     Status: Abnormal   Result Value Ref Range    Glucose 91 65 - 99 mg/dL    Urea Nitrogen 11 6 - 24 mg/dL    Creatinine 0.66 (L) 0.76 - 1.27 mg/dL    eGFR If NonAfricn Am 107 >59 mL/min/1.73    eGFR If Africn Am 124 >59 mL/min/1.73    BUN/Creatinine Ratio 17 9 - 20    Sodium 139 134 - 144 mmol/L    Potassium 4.5 3.5 - 5.2 mmol/L    Chloride 104 96 - 106 mmol/L    Total CO2 21 20 - 29 mmol/L    Calcium 9.0 8.7 - 10.2 mg/dL    Protein Total 6.5 6.0 - 8.5 g/dL    Albumin 3.9 3.8 - 4.9 g/dL    Globulin, Total 2.6 1.5 - 4.5 g/dL    A/G Ratio 1.5 1.2 - 2.2    Bilirubin Total 0.6 0.0 - 1.2 mg/dL    Alkaline Phosphatase 44 (L) 48 - 121 IU/L    AST 20 0 - 40 IU/L    ALT 19 0 - 44 IU/L    Narrative    Performed at:  01 - [facility]  [place] [address], [place], [place]  [id number]  : Mark Swan MD, Phone:  [phone]   CBC with Diff/Plt ([facility])     Status: None   Result Value Ref Range    WBC x10/cmm 4.6 3.8 - 11.0 x10/cmm    % Lymphocytes 32.3 20.5 - 51.1 %    % Monocytes 8.6 1.7 - 9.3 %    % Granulocytes 59.1 42.2 - 75.2 %    RBC x10/cmm 4.77 4.2 - 5.9 x10/cmm    Hemoglobin 14.8 13.4 - 17.5 g/dl    Hematocrit 42.1 39 - 51 %    MCV 88.2 80 - 100 fL    MCH 31.0 27.0 - 31.0 pg    MCHC 35.1 33.0 - 37.0 g/dL    Platelet Count 262 140 - 450 K/uL

## 2021-06-10 LAB
ALBUMIN SERPL-MCNC: 3.9 G/DL (ref 3.8–4.9)
ALBUMIN/GLOB SERPL: 1.5 {RATIO} (ref 1.2–2.2)
ALP SERPL-CCNC: 44 IU/L (ref 48–121)
ALT SERPL-CCNC: 19 IU/L (ref 0–44)
AST SERPL-CCNC: 20 IU/L (ref 0–40)
BILIRUB SERPL-MCNC: 0.6 MG/DL (ref 0–1.2)
BUN SERPL-MCNC: 11 MG/DL (ref 6–24)
BUN/CREATININE RATIO: 17 (ref 9–20)
CALCIUM SERPL-MCNC: 9 MG/DL (ref 8.7–10.2)
CHLORIDE SERPLBLD-SCNC: 104 MMOL/L (ref 96–106)
CHOLEST SERPL-MCNC: 172 MG/DL (ref 100–199)
CREAT SERPL-MCNC: 0.66 MG/DL (ref 0.76–1.27)
EGFR IF AFRICN AM: 124 ML/MIN/1.73
EGFR IF NONAFRICN AM: 107 ML/MIN/1.73
GLOBULIN, TOTAL: 2.6 G/DL (ref 1.5–4.5)
GLUCOSE SERPL-MCNC: 91 MG/DL (ref 65–99)
HCV AB SERPL QL IA: <0.1 S/CO RATIO (ref 0–0.9)
HDLC SERPL-MCNC: 57 MG/DL
LDL/HDL RATIO: 1.8 RATIO (ref 0–3.6)
LDLC SERPL CALC-MCNC: 102 MG/DL (ref 0–99)
POTASSIUM SERPL-SCNC: 4.5 MMOL/L (ref 3.5–5.2)
PROT SERPL-MCNC: 6.5 G/DL (ref 6–8.5)
SODIUM SERPL-SCNC: 139 MMOL/L (ref 134–144)
TOTAL CO2: 21 MMOL/L (ref 20–29)
TRIGL SERPL-MCNC: 65 MG/DL (ref 0–149)
VLDLC SERPL CALC-MCNC: 13 MG/DL (ref 5–40)

## 2021-10-04 ENCOUNTER — OFFICE VISIT (OUTPATIENT)
Dept: FAMILY MEDICINE | Facility: CLINIC | Age: 56
End: 2021-10-04

## 2021-10-04 VITALS
BODY MASS INDEX: 28.72 KG/M2 | HEART RATE: 58 BPM | TEMPERATURE: 97.5 F | OXYGEN SATURATION: 99 % | SYSTOLIC BLOOD PRESSURE: 124 MMHG | DIASTOLIC BLOOD PRESSURE: 64 MMHG | WEIGHT: 182 LBS

## 2021-10-04 DIAGNOSIS — R07.81 RIB PAIN: ICD-10-CM
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
## 2021-10-04 PROBLEM — B07.8 COMMON WART: Status: RESOLVED | Noted: 2017-09-19 | Resolved: 2021-10-04

## 2021-10-04 PROCEDURE — 99213 OFFICE O/P EST LOW 20 MIN: CPT | Performed by: NURSE PRACTITIONER

## 2021-10-04 NOTE — PROGRESS NOTES
Problem(s) Oriented visit        SUBJECTIVE:                                                    Terrence Arrington is a 56 year old male who presents to clinic today for the following health issues :    Fell about 12 days ago forward hitting left side of chest and rib cage. Was holding yeti mug in one hand and charcoal bin in in other so couldn't brace his fall. Had pain but no difficulty breathing. Pain persisted with good days and bad. Today pain is much better than it has been. Is a / and has still been working. Unable to lift barstools up/down though. Denies chest pressure, palpitations, shortness of breath, bruising.     Problem list, Medication list, Allergies, and Medical/Social/Surgical histories reviewed in [facility] and updated as appropriate.   Additional history: as documented    ROS:  4 point ROS completed and negative except noted above, including Gen, CV, Resp, MS    OBJECTIVE:                                                    /64   Pulse 58   Temp 97.5  F (36.4  C) (Skin)   Wt 82.6 kg (182 lb)   SpO2 99%   BMI 28.72 kg/m    Body mass index is 28.72 kg/m .   GENERAL: healthy, alert and no distress  RESP: lungs clear to auscultation - no rales, rhonchi or wheezes  CV: regular rates and rhythm, normal S1 S2, no S3 or S4 and no murmur, click or rub  MS: slight tenderness left anterior ribs  SKIN: no suspicious lesions or rashes  PSYCH: affect normal/bright     ASSESSMENT/PLAN:                                                      Terrence was seen today for fall.    Diagnoses and all orders for this visit:    Fall, initial encounter  Rib pain  Discussed rib injuries with the patient.  The acute phase of the pain typically takes a week or so to improve, then slowly gets better over 4-6 weeks.   Told the patient to be sure to hold a pillow tightly against their chest when coughing, it will help with the pain.   Recommended deep breathing regularly throughout the day at least every hour, to  help prevent atelectasis and pneumonias since people can preferentially inflate one side of the lungs vs the others.    Instructed the patient to call if they develop any fevers associated with cough and purulent sputum as these can be signs and symptoms of pneumonia.   NSAIDs, ice, rest as needed  X-ray if worsening      See Patient Instructions  Patient Instructions   Ibuprofen as needed for pain and inflammation  Apply ice as needed to cut down inflammation  Biofreeze is fine    Call if symptoms worsening again -> I will order rib/chest x-ray      CONNIE Lee CNP  [facility]  [phone]    For any issues my office # is [phone]

## 2021-10-04 NOTE — PATIENT INSTRUCTIONS
Ibuprofen as needed for pain and inflammation  Apply ice as needed to cut down inflammation  Biofreeze is fine    Call if symptoms worsening again -> I will order rib/chest x-ray

## 2023-06-03 ENCOUNTER — HOSPITAL ENCOUNTER (OUTPATIENT)
Facility: CLINIC | Age: 58
Setting detail: OBSERVATION
Discharge: HOME | End: 2023-06-04
Attending: INTERNAL MEDICINE | Admitting: INTERNAL MEDICINE
Payer: COMMERCIAL

## 2023-06-03 DIAGNOSIS — N20.0 NEPHROLITHIASIS: Primary | ICD-10-CM

## 2023-06-03 LAB — TSH SERPL DL<=0.005 MIU/L-ACNC: 2.66 UIU/ML (ref 0.3–4.2)

## 2023-06-03 PROCEDURE — 99223 1ST HOSP IP/OBS HIGH 75: CPT | Performed by: INTERNAL MEDICINE

## 2023-06-03 PROCEDURE — 96375 TX/PRO/DX INJ NEW DRUG ADDON: CPT

## 2023-06-03 PROCEDURE — G0378 HOSPITAL OBSERVATION PER HR: HCPCS

## 2023-06-03 PROCEDURE — 96376 TX/PRO/DX INJ SAME DRUG ADON: CPT

## 2023-06-03 PROCEDURE — 250N000011 HC RX IP 250 OP 636: Performed by: INTERNAL MEDICINE

## 2023-06-03 PROCEDURE — 87086 URINE CULTURE/COLONY COUNT: CPT | Performed by: INTERNAL MEDICINE

## 2023-06-03 PROCEDURE — 93005 ELECTROCARDIOGRAM TRACING: CPT

## 2023-06-03 PROCEDURE — 84443 ASSAY THYROID STIM HORMONE: CPT | Performed by: INTERNAL MEDICINE

## 2023-06-03 PROCEDURE — 258N000003 HC RX IP 258 OP 636: Performed by: INTERNAL MEDICINE

## 2023-06-03 PROCEDURE — 93010 ELECTROCARDIOGRAM REPORT: CPT | Performed by: INTERNAL MEDICINE

## 2023-06-03 PROCEDURE — 36415 COLL VENOUS BLD VENIPUNCTURE: CPT | Performed by: INTERNAL MEDICINE

## 2023-06-03 PROCEDURE — 96374 THER/PROPH/DIAG INJ IV PUSH: CPT

## 2023-06-03 RX ORDER — AMOXICILLIN 250 MG
2 CAPSULE ORAL 2 TIMES DAILY PRN
Status: DISCONTINUED | OUTPATIENT
Start: 2023-06-03 | End: 2023-06-04 | Stop reason: HOSPADM

## 2023-06-03 RX ORDER — TAMSULOSIN HYDROCHLORIDE 0.4 MG/1
0.4 CAPSULE ORAL DAILY
Status: DISCONTINUED | OUTPATIENT
Start: 2023-06-04 | End: 2023-06-04 | Stop reason: HOSPADM

## 2023-06-03 RX ORDER — ONDANSETRON 2 MG/ML
4 INJECTION INTRAMUSCULAR; INTRAVENOUS EVERY 6 HOURS PRN
Status: DISCONTINUED | OUTPATIENT
Start: 2023-06-03 | End: 2023-06-04 | Stop reason: HOSPADM

## 2023-06-03 RX ORDER — PROCHLORPERAZINE 25 MG
25 SUPPOSITORY, RECTAL RECTAL EVERY 12 HOURS PRN
Status: DISCONTINUED | OUTPATIENT
Start: 2023-06-03 | End: 2023-06-04 | Stop reason: HOSPADM

## 2023-06-03 RX ORDER — VITAMIN B COMPLEX
1 TABLET ORAL DAILY
COMMUNITY

## 2023-06-03 RX ORDER — SODIUM CHLORIDE 9 MG/ML
INJECTION, SOLUTION INTRAVENOUS CONTINUOUS
Status: DISCONTINUED | OUTPATIENT
Start: 2023-06-03 | End: 2023-06-04

## 2023-06-03 RX ORDER — HYDROMORPHONE HYDROCHLORIDE 1 MG/ML
0.3 INJECTION, SOLUTION INTRAMUSCULAR; INTRAVENOUS; SUBCUTANEOUS
Status: DISCONTINUED | OUTPATIENT
Start: 2023-06-03 | End: 2023-06-04 | Stop reason: HOSPADM

## 2023-06-03 RX ORDER — BISACODYL 10 MG
10 SUPPOSITORY, RECTAL RECTAL DAILY PRN
Status: DISCONTINUED | OUTPATIENT
Start: 2023-06-03 | End: 2023-06-04 | Stop reason: HOSPADM

## 2023-06-03 RX ORDER — NALOXONE HYDROCHLORIDE 0.4 MG/ML
0.4 INJECTION, SOLUTION INTRAMUSCULAR; INTRAVENOUS; SUBCUTANEOUS
Status: DISCONTINUED | OUTPATIENT
Start: 2023-06-03 | End: 2023-06-04 | Stop reason: HOSPADM

## 2023-06-03 RX ORDER — PROCHLORPERAZINE MALEATE 10 MG
10 TABLET ORAL EVERY 6 HOURS PRN
Status: DISCONTINUED | OUTPATIENT
Start: 2023-06-03 | End: 2023-06-04 | Stop reason: HOSPADM

## 2023-06-03 RX ORDER — AMOXICILLIN 250 MG
1 CAPSULE ORAL 2 TIMES DAILY PRN
Status: DISCONTINUED | OUTPATIENT
Start: 2023-06-03 | End: 2023-06-04 | Stop reason: HOSPADM

## 2023-06-03 RX ORDER — NALOXONE HYDROCHLORIDE 0.4 MG/ML
0.2 INJECTION, SOLUTION INTRAMUSCULAR; INTRAVENOUS; SUBCUTANEOUS
Status: DISCONTINUED | OUTPATIENT
Start: 2023-06-03 | End: 2023-06-04 | Stop reason: HOSPADM

## 2023-06-03 RX ORDER — OXYCODONE HYDROCHLORIDE 5 MG/1
5 TABLET ORAL EVERY 4 HOURS PRN
Status: DISCONTINUED | OUTPATIENT
Start: 2023-06-03 | End: 2023-06-04 | Stop reason: HOSPADM

## 2023-06-03 RX ORDER — CEFTRIAXONE 1 G/1
1 INJECTION, POWDER, FOR SOLUTION INTRAMUSCULAR; INTRAVENOUS EVERY 24 HOURS
Status: DISCONTINUED | OUTPATIENT
Start: 2023-06-03 | End: 2023-06-04

## 2023-06-03 RX ORDER — ONDANSETRON 4 MG/1
4 TABLET, ORALLY DISINTEGRATING ORAL EVERY 6 HOURS PRN
Status: DISCONTINUED | OUTPATIENT
Start: 2023-06-03 | End: 2023-06-04 | Stop reason: HOSPADM

## 2023-06-03 RX ORDER — LIDOCAINE 40 MG/G
CREAM TOPICAL
Status: DISCONTINUED | OUTPATIENT
Start: 2023-06-03 | End: 2023-06-04 | Stop reason: HOSPADM

## 2023-06-03 RX ADMIN — SODIUM CHLORIDE: 9 INJECTION, SOLUTION INTRAVENOUS at 17:11

## 2023-06-03 RX ADMIN — CEFTRIAXONE SODIUM 1 G: 1 INJECTION, POWDER, FOR SOLUTION INTRAMUSCULAR; INTRAVENOUS at 17:37

## 2023-06-03 RX ADMIN — HYDROMORPHONE HYDROCHLORIDE 0.3 MG: 1 INJECTION, SOLUTION INTRAMUSCULAR; INTRAVENOUS; SUBCUTANEOUS at 22:27

## 2023-06-03 RX ADMIN — HYDROMORPHONE HYDROCHLORIDE 0.3 MG: 1 INJECTION, SOLUTION INTRAMUSCULAR; INTRAVENOUS; SUBCUTANEOUS at 18:44

## 2023-06-03 ASSESSMENT — COLUMBIA-SUICIDE SEVERITY RATING SCALE - C-SSRS
3. HAVE YOU BEEN THINKING ABOUT HOW YOU MIGHT KILL YOURSELF?: NO
2. HAVE YOU ACTUALLY HAD ANY THOUGHTS OF KILLING YOURSELF IN THE PAST MONTH?: NO
4. HAVE YOU HAD THESE THOUGHTS AND HAD SOME INTENTION OF ACTING ON THEM?: NO
1. IN THE PAST MONTH, HAVE YOU WISHED YOU WERE DEAD OR WISHED YOU COULD GO TO SLEEP AND NOT WAKE UP?: NO
6. HAVE YOU EVER DONE ANYTHING, STARTED TO DO ANYTHING, OR PREPARED TO DO ANYTHING TO END YOUR LIFE?: NO
5. HAVE YOU STARTED TO WORK OUT OR WORKED OUT THE DETAILS OF HOW TO KILL YOURSELF? DO YOU INTEND TO CARRY OUT THIS PLAN?: NO

## 2023-06-03 ASSESSMENT — ACTIVITIES OF DAILY LIVING (ADL)
ADLS_ACUITY_SCORE: 33
ADLS_ACUITY_SCORE: 33
ADLS_ACUITY_SCORE: 35
ADLS_ACUITY_SCORE: 33

## 2023-06-03 NOTE — PROVIDER NOTIFICATION
Dr. Jean asked writer to page urology to see if any intervention needed tonight regarding kidney stone and forniceal rupture. Dr. Castro stated ok to eat tonight, NPO at midnight and his colleague will see patient tomorrow. Primary RN, Alaina notified. Dr. Jean aware and ok with regular diet, order placed

## 2023-06-03 NOTE — H&P
[facility]    History and Physical - Hospitalist Service       Date of Admission:  6/3/2023    Assessment & Plan      Terrence Arrington is a 58 year old male admitted on 6/3/2023. He presented with left flank pain intermittently for the last 2 days which got much worse today.  CT scan of the abdomen pelvis without contrast showed 3 mm stone in the left UVJ junction with mild hydronephrosis with possible forniceal rupture    Left sided UVJ junction nephrolithiasis with mild hydronephrosis  Possible forniceal rupture;  Admit him to the hospital under observation status  [place] [facility] consulted and was updated on patient's arrival to the hospital and as per urology most likely the stone will pass they will see him tomorrow  Started on IV ceftriaxone.  Urine culture will be ordered  IV fluids normal saline at 125 mL/h  N.p.o. after midnight for possible procedure  Symptomatic treatment with IV analgesics and antiemetics    Chronic tobacco use;  Encouraged to [place] cessation    Liver lesions;  Could be cysts needs outpatient MRI to further evaluate these.  This finding on admission has not been discussed with the patient or family       Diet: Regular Diet Adult  NPO per Anesthesia Guidelines for Procedure/Surgery Except for: Meds    DVT Prophylaxis: Ambulate every shift  Sr Catheter: Not present  Lines: None     Cardiac Monitoring: ACTIVE order. Indication: Tachyarrhythmias, acute (48 hours)  Code Status: Full Code      Clinically Significant Risk Factors Present on Admission                               Disposition Plan      Expected Discharge Date: 06/05/2023             In the next 1 to 2 days when okay with urology       Rosalinda Jean MD  Hospitalist Service  [facility]  Securely message with [employer] (more info)  Text page via [employer] Paging/Directory     ______________________________________________________________________    Chief Complaint   Left-sided flank  pain and nausea    History is obtained from the patient, discussion with ED physician and review of medical records and care everywhere    History of Present Illness   Terrence Arrington is a 58 year old male who is pretty healthy presented to the urgency room in [place] with a left flank pain.  Patient started having this pain 2 days ago intermittently.  Last night it got worse and when he woke up this morning he was unable to bear with the pain so presented to the emergency room in [place].  Associate with nausea but no vomiting.  In the emergency room he was evaluated by Dr. Jennifer Cooper.  His vital signs showed blood pressure 113/72 pulse rate of 52 temperature 97.7  F afebrile respirate of 16 he was satting 97% on room air.  CBC showed WBC count elevated at 14.7 hemoglobin 17.4, platelet count normal at 213 K.  BMP showed sodium 138 potassium 3.8 chloride 103 bicarb 23 anion gap of 12 calcium 9 BUN 18 creatinine 1.21 lactate was normal at 1.5.  Urinalysis showed nitrites negative leukocyte esterase negative, RBC 3-5, WBCs 0-2 few bacteria present.  UA was negative for UTI.  To evaluate left flank pain CT scan of the abdomen pelvis without contrast was done and it showed there is an obstructing 3 mm stone in the left UVJ with mild upstream hydroureteronephrosis.  Small amount of left perinephric fluid consistent with forniceal rupture.  Small left renal calculi.  Intermittent hypodensities in the left hepatic lobe which may represent focal fatty infiltration or masses.  Recommend nonemergent liver MRI if to further evaluate.  [facility] with Dr. Maldonado was contacted by ED physician.  Patient was transferred as direct admission to [facility] for urology evaluation and treatment patient appears comfortable on arrival to [facility].  Pain well controlled no nausea or vomiting currently he is resting comfortably in bed.       Past Medical History    Past Medical History:   Diagnosis Date     Fracture of right  foot     x2       Past Surgical History   Past Surgical History:   Procedure Laterality Date     APPENDECTOMY       HERNIORRHAPHY INGUINAL CHILD BILATERAL       LASIK BILATERAL Bilateral      VASECTOMY         Prior to Admission Medications   None        Review of Systems    The 10 point Review of Systems is negative other than noted in the HPI     Social History   I have reviewed this patient's social history and updated it with pertinent information if needed.  Social History     Tobacco Use     Smoking status: Former     Types: Cigars     Quit date:      Years since quittin.4     Smokeless tobacco: Never     Tobacco comments:     Occasionallycigars   Substance Use Topics     Alcohol use: Yes     Comment: Mostly socially     Drug use: Yes     Types: Marijuana       Family History   I have reviewed this patient's family history and updated it with pertinent information if needed.  Family History   Problem Relation Age of Onset     Leukemia Mother 56     Other - See Comments Father 90        Cardiac stents, Smoker     Cancer Maternal Grandmother      Dementia Paternal Grandmother 80     Colon Cancer No family hx of      Prostate Cancer No family hx of        Allergies   No Known Allergies     Physical Exam   Vital Signs: Temp: 98.5  F (36.9  C) Temp src: Oral BP: (!) 160/90 Pulse: (!) 45   Resp: 16 SpO2: 99 % O2 Device: None (Room air)    Weight: 0 lbs 0 oz    General Appearance: Alert oriented x4 pleasant male lying comfortably in bed not in acute distress with wife at bedside  Eyes: No scleral icterus or conjunctival injection noted  HEENT: Head is atraumatic normocephalic, normal mucous membranes noted  Respiratory: Clear to auscultation bilaterally no rales rhonchi or wheezing  Cardiovascular: Normal rate rhythm regular, no murmurs  GI: Soft, tender in the left flank area, no distention, bowel sounds positive, no guarding rigidity or rebound  Skin: Warm and dry  Musculoskeletal: No clubbing cyanosis or  edema  Neurologic: No focal weakness  Psychiatric: Mood and affect are normal    Medical Decision Making       75 MINUTES SPENT BY ME on the date of service doing chart review, history, exam, documentation & further activities per the note.      Data         Imaging results reviewed over the past 24 hrs:   Recent Results (from the past 24 hour(s))   CT ABDOMEN PELVIS STONE PROTOCOL WO    Narrative    For Patients: As a result of the 21st Century Cures Act, medical imaging exams and procedure reports are released immediately into your electronic medical record. You may view this report before your referring provider. If you have questions, please contact your health care provider.    EXAM: CT ABDOMEN PELVIS STONE PROTOCOL WO  LOCATION: [facility] [place]  DATE/TIME: 6/3/2023 12:38 PM CDT    INDICATION: Flank pain, kidney stone suspected  COMPARISON: None.  TECHNIQUE: CT scan of the abdomen and pelvis was performed without oral or IV contrast. Multiplanar reformats were obtained. Dose reduction techniques were used.  CONTRAST: None.    FINDINGS:   LOWER CHEST: Small intramuscular lipoma in the posterior right chest wall.    HEPATOBILIARY: Subtle round hypodensities in hepatic segment 4.    PANCREAS: Normal.    SPLEEN: Normal.    ADRENAL GLANDS: Normal.    KIDNEY/BLADDER: There is an obstructing 3 mm stone at the left UVJ with mild upstream hydroureteronephrosis. Small amount of left perinephric fluid consistent with forniceal rupture. Small left renal calculi.    BOWEL: Unremarkable    LYMPH NODES: Normal.    VASCULATURE: Unremarkable.    PELVIC ORGANS: Normal.    MUSCULOSKELETAL: Bilateral L5 pars defects with grade 1 anterolisthesis of L5 on S1.    Impression    1.  There is an obstructing 3 mm stone in the distal left ureter at the UVJ with mild upstream hydroureteronephrosis. There is mild left perinephric fluid consistent with forniceal rupture.    2.  Left nephrolithiasis.    3.  Indeterminant  hypodensities in the left hepatic lobe which may represent focal fatty infiltration or masses. Recommend nonemergent liver MRI to further evaluate.

## 2023-06-04 VITALS
SYSTOLIC BLOOD PRESSURE: 143 MMHG | TEMPERATURE: 99 F | DIASTOLIC BLOOD PRESSURE: 83 MMHG | RESPIRATION RATE: 16 BRPM | OXYGEN SATURATION: 97 % | HEART RATE: 67 BPM

## 2023-06-04 LAB
ANION GAP SERPL CALCULATED.3IONS-SCNC: 9 MMOL/L (ref 7–15)
BUN SERPL-MCNC: 13.5 MG/DL (ref 8–23)
CALCIUM SERPL-MCNC: 8.2 MG/DL (ref 8.6–10)
CHLORIDE SERPL-SCNC: 107 MMOL/L (ref 98–107)
CREAT SERPL-MCNC: 1.2 MG/DL (ref 0.67–1.17)
DEPRECATED HCO3 PLAS-SCNC: 22 MMOL/L (ref 22–29)
ERYTHROCYTE [DISTWIDTH] IN BLOOD BY AUTOMATED COUNT: 12 % (ref 10–15)
GFR SERPL CREATININE-BSD FRML MDRD: 70 ML/MIN/1.73M2
GLUCOSE SERPL-MCNC: 107 MG/DL (ref 70–99)
HCT VFR BLD AUTO: 42.2 % (ref 40–53)
HGB BLD-MCNC: 14.2 G/DL (ref 13.3–17.7)
MCH RBC QN AUTO: 31.6 PG (ref 26.5–33)
MCHC RBC AUTO-ENTMCNC: 33.6 G/DL (ref 31.5–36.5)
MCV RBC AUTO: 94 FL (ref 78–100)
PLATELET # BLD AUTO: 214 10E3/UL (ref 150–450)
POTASSIUM SERPL-SCNC: 4.1 MMOL/L (ref 3.4–5.3)
RBC # BLD AUTO: 4.49 10E6/UL (ref 4.4–5.9)
SODIUM SERPL-SCNC: 138 MMOL/L (ref 136–145)
WBC # BLD AUTO: 12.7 10E3/UL (ref 4–11)

## 2023-06-04 PROCEDURE — 96376 TX/PRO/DX INJ SAME DRUG ADON: CPT

## 2023-06-04 PROCEDURE — 250N000013 HC RX MED GY IP 250 OP 250 PS 637: Performed by: INTERNAL MEDICINE

## 2023-06-04 PROCEDURE — 99232 SBSQ HOSP IP/OBS MODERATE 35: CPT | Performed by: INTERNAL MEDICINE

## 2023-06-04 PROCEDURE — 85027 COMPLETE CBC AUTOMATED: CPT | Performed by: INTERNAL MEDICINE

## 2023-06-04 PROCEDURE — 36415 COLL VENOUS BLD VENIPUNCTURE: CPT | Performed by: INTERNAL MEDICINE

## 2023-06-04 PROCEDURE — G0378 HOSPITAL OBSERVATION PER HR: HCPCS

## 2023-06-04 PROCEDURE — 80048 BASIC METABOLIC PNL TOTAL CA: CPT | Performed by: INTERNAL MEDICINE

## 2023-06-04 PROCEDURE — 250N000011 HC RX IP 250 OP 636: Performed by: INTERNAL MEDICINE

## 2023-06-04 PROCEDURE — 258N000003 HC RX IP 258 OP 636: Performed by: INTERNAL MEDICINE

## 2023-06-04 RX ORDER — TAMSULOSIN HYDROCHLORIDE 0.4 MG/1
0.4 CAPSULE ORAL DAILY
Qty: 30 CAPSULE | Refills: 0 | Status: SHIPPED | OUTPATIENT
Start: 2023-06-05 | End: 2023-07-05

## 2023-06-04 RX ADMIN — TAMSULOSIN HYDROCHLORIDE 0.4 MG: 0.4 CAPSULE ORAL at 08:14

## 2023-06-04 RX ADMIN — SODIUM CHLORIDE: 9 INJECTION, SOLUTION INTRAVENOUS at 01:37

## 2023-06-04 RX ADMIN — HYDROMORPHONE HYDROCHLORIDE 0.3 MG: 1 INJECTION, SOLUTION INTRAMUSCULAR; INTRAVENOUS; SUBCUTANEOUS at 01:31

## 2023-06-04 RX ADMIN — HYDROMORPHONE HYDROCHLORIDE 0.3 MG: 1 INJECTION, SOLUTION INTRAMUSCULAR; INTRAVENOUS; SUBCUTANEOUS at 04:29

## 2023-06-04 RX ADMIN — SODIUM CHLORIDE: 9 INJECTION, SOLUTION INTRAVENOUS at 09:47

## 2023-06-04 ASSESSMENT — ACTIVITIES OF DAILY LIVING (ADL)
ADLS_ACUITY_SCORE: 33

## 2023-06-04 NOTE — PROGRESS NOTES
Urology brief note.    58 year old found to have 3mm UVJ stone after 2 days of left flank pain which got worse yesterday.  Admitted for pain control and possible stone procedure.      Urology rounder will see in AM and decide if intervention needed.  Please keep NPO after midnight.    Moiz Maldonado MD, MD

## 2023-06-04 NOTE — PLAN OF CARE
Goal Outcome Evaluation:      Plan of Care Reviewed With: patient    Summary: Presented with L. Flank pain, found to have 3mm L. 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 Asymptomatic at this time.   MOBILITY: independent   PAIN MANAGMENT: Reporting mild pain L. Flank/back 3-7/10. PRN medications available. Managed well with IV dilaudid   DIET: regular/tolerating. Denies nausea at this time. NPO @ [place]  BOWEL/BLADDER: BS active x 4, continent. Passing flatus, last BM today   ABNL LAB/BG: need to collect UA   DRAIN/DEVICES: PIV infusing NS @ 125 mL/hr . On IV antibiotics   TELEMETRY RHYTHM: SB   SKIN: WDL   TESTS/PROCEDURES: EKG completed. possible procedure tomorrow if stone does not pass. Urology to see patient in the am.   D/C DAY/GOALS/PLACE: pending clinical improvement   OTHER IMPORTANT INFO: urology consulted. Wife present at bedside and updated on plan of care.

## 2023-06-04 NOTE — PROGRESS NOTES
[facility]    Medicine Progress Note - Hospitalist Service    Date of Admission:  6/3/2023    Assessment & Plan   Terrence Arrington is a 58 year old male admitted on 6/3/2023. He presented with left flank pain intermittently for the last 2 days which got much worse today.  CT scan of the abdomen pelvis without contrast showed 3 mm stone in the left UVJ junction with mild hydronephrosis with possible forniceal rupture     Left sided UVJ junction nephrolithiasis with mild hydronephrosis  Possible forniceal rupture;  - continue IVF  - continue ceftriaxone  - urine culture is no growth to date  - pain control  - continue NPO until urology eval/recs  - [place] [facility] is consulted, aware of pt  - strain urine  - bmp is pending this morning, will follow  - wbc down to 12 this am, 14 yesterday at outside hospital    Asymptomatic sinus bradycardia  EKG shows sinus rogers with rate of 52.  Tele also shows sinus rogers. 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   - continue to monitor     Chronic tobacco use  Tobacco cessation    Liver lesions-incidental finding  CT abdomen shows Indeterminant hypodensities in the left hepatic lobe which may represent focal fatty infiltration or masses. Recommended non-emergent liver MRI for further eval.   - this is discussed with patient on 6/4, he will f/u OP           Diet: NPO per Anesthesia Guidelines for Procedure/Surgery Except for: Meds    DVT Prophylaxis: Ambulate every shift  Sr Catheter: Not present  Lines: None     Cardiac Monitoring: ACTIVE order. Indication: Tachyarrhythmias, acute (48 hours)  Code Status: Full Code      Clinically Significant Risk Factors Present on Admission                                Disposition Plan anticipate discharge in the next 24 hrs pending urology evaluation     Expected Discharge Date: 06/04/2023                  Harjit Crockett MD  Hospitalist Service  [facility]  Securely message with [employer] (more info)  Text page via [facility] Paging/Directory   ______________________________________________________________________    Interval History   Patient reports pain is controlled. Last IV dilaudid dose at 430am. Straining urine, but no stone passed per report.   Denies n/v. Denies sob or lightheadedness.     Physical Exam   Vital Signs: Temp: 98.3  F (36.8  C) Temp src: Oral BP: (!) 161/90 Pulse: (!) 45   Resp: 16 SpO2: 96 % O2 Device: None (Room air)    Weight: 0 lbs 0 oz    General Appearance: Alert, awake and no apparent distress  Respiratory: CTAB, no wheezing  Cardiovascular: regular, rogers  GI: soft and non-tender  Skin: warm and dry      Medical Decision Making       45 MINUTES SPENT BY ME on the date of service doing chart review, history, exam, documentation & further activities per the note.  MANAGEMENT DISCUSSED with the following over the past 24 hours: patient and RN   NOTE(S)/MEDICAL RECORDS REVIEWED over the past 24 hours: urology  Tests ORDERED & REVIEWED in the past 24 hours:  - BMP  - CBC  - urine culture  Tests personally interpreted in the past 24 hours:  - EKG, CT a/p showing as above      Data     I have personally reviewed the following data over the past 24 hrs:    12.7 (H)  \   14.2   / 214     N/A N/A N/A /  N/A   N/A N/A N/A \       TSH: 2.66 T4: N/A A1C: N/A       Imaging results reviewed over the past 24 hrs:   Recent Results (from the past 24 hour(s))   CT ABDOMEN PELVIS STONE PROTOCOL WO    Narrative    For Patients: As a result of the 21st Century Cures Act, medical imaging exams and procedure reports are released immediately into your electronic medical record. You may view this report before your referring provider. If you have questions, please contact your health care provider.    EXAM: CT ABDOMEN PELVIS STONE PROTOCOL WO  LOCATION: [facility] [place]  DATE/TIME: 6/3/2023 12:38 PM CDT    INDICATION: Flank pain, kidney stone  suspected  COMPARISON: None.  TECHNIQUE: CT scan of the abdomen and pelvis was performed without oral or IV contrast. Multiplanar reformats were obtained. Dose reduction techniques were used.  CONTRAST: None.    FINDINGS:   LOWER CHEST: Small intramuscular lipoma in the posterior right chest wall.    HEPATOBILIARY: Subtle round hypodensities in hepatic segment 4.    PANCREAS: Normal.    SPLEEN: Normal.    ADRENAL GLANDS: Normal.    KIDNEY/BLADDER: There is an obstructing 3 mm stone at the left UVJ with mild upstream hydroureteronephrosis. Small amount of left perinephric fluid consistent with forniceal rupture. Small left renal calculi.    BOWEL: Unremarkable    LYMPH NODES: Normal.    VASCULATURE: Unremarkable.    PELVIC ORGANS: Normal.    MUSCULOSKELETAL: Bilateral L5 pars defects with grade 1 anterolisthesis of L5 on S1.    Impression    1.  There is an obstructing 3 mm stone in the distal left ureter at the UVJ with mild upstream hydroureteronephrosis. There is mild left perinephric fluid consistent with forniceal rupture.    2.  Left nephrolithiasis.    3.  Indeterminant hypodensities in the left hepatic lobe which may represent focal fatty infiltration or masses. Recommend nonemergent liver MRI to further evaluate.

## 2023-06-04 NOTE — PLAN OF CARE
Goal Outcome Evaluation:    Pt discharged home in stable condition at 1240 with all belongings and medications. PIV removed and discharge instructions reviewed with patient and wife. No questions/comments/concerns at this time.

## 2023-06-04 NOTE — PROGRESS NOTES
Admission    Patient arrives to room 633-01 via cart from [facility].  Care plan note: A & O x 4. Pleasant and cooperative. VSS on RA ex bradycardic. Reporting mild pain to L. Flank/back. Belongings remain with patient. Admission questions completed. Waiting for hospitalist to see patient and place orders.      Inpatient nursing criteria listed below were met:    Did you put disposition on whiteboard and in sticky note: Yes  Full skin assessment done (add LDA if skin issue present) :Yes  Isolation education started/completed NA  Patient allergies verified with patient: Yes  Fall Risk? (Care plan updated, Education given and documented) No  Primary Care Plan initiated: Yes  Home medications documented in belongings flowsheet: Yes  Patient belongings documented in belongings flowsheet: Yes  Reminder note (belongings/ medications) placed in discharge instructions:Yes  Admission profile/ required documentation complete: Yes  If patient is a 72 hour hold/Commitment are belongings removed from room and locked up? HUGH Valdes RN on 6/3/2023 at 10:55 PM

## 2023-06-04 NOTE — DISCHARGE SUMMARY
[facility]  Hospitalist Discharge Summary      Date of Admission:  6/3/2023  Date of Discharge:  6/4/2023  Discharging Provider: Harjit Crockett MD  Discharge Service: Hospitalist Service    Discharge Diagnoses   See below    Clinically Significant Risk Factors          Follow-ups Needed After Discharge   Follow-up Appointments     Follow Up ([facility])      Follow up with [place] [facility] in 1-2 weeks Please call [phone] to   schedule an appointment.    [facility]  [address] [place] [address]  You may call [phone] with any questions or concerns.         Appointments on [place] and/or [facility] (with [facility] or [facility]   provider or service). Call [phone] if you haven't heard regarding   these appointments within 7 days of discharge.         Follow-up and recommended labs and tests       Follow up with primary care provider, John Lee, within 7 days for   hospital follow- up.  The following labs/tests are recommended: Basic   metabolic panel and complete blood count in 1 week.   Also recommend liver MRI to evaluate the abnormality noted on CT scan.    This can be arranged through your primary care doctor.             Unresulted Labs Ordered in the Past 30 Days of this Admission     Date and Time Order Name Status Description    6/3/2023  4:10 PM Urine Culture In process           Discharge Disposition   Discharged to home  Condition at discharge: Stable    Hospital Course   Terrence Arrington is a 58 year old male admitted on 6/3/2023. He presented with left flank pain intermittently for the last 2 days which got much worse today.  CT scan of the abdomen pelvis without contrast showed 3 mm stone in the left UVJ junction with mild hydronephrosis with possible forniceal rupture     Left sided UVJ junction nephrolithiasis with mild hydronephrosis  Possible forniceal rupture;  * patient passed stone this morning .  *  [place] [facility] consulted, discussed with urology regarding antibiotics regarding possible forniceal rupture, they are not recommend antibiotic at discharge  - patient to f/u with urology in 1-2 weeks  - recommend f/u cbc and BMP in 1 week with pcp  - discharge plans/recommendations discussed with patient     Asymptomatic sinus bradycardia  EKG shows sinus rogers with rate of 52.  Tele also shows sinus rogers. 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      Chronic tobacco use  Tobacco cessation    Liver lesions-incidental finding  CT abdomen shows Indeterminant hypodensities in the left hepatic lobe which may represent focal fatty infiltration or masses. Recommended non-emergent liver MRI for further eval.   - this is discussed with patient on 6/4, he will f/u OP with PCP to arrange MRI         Consultations This Hospital Stay   UROLOGY IP CONSULT    Code Status   Full Code    Time Spent on this Encounter          Harjit Crockett MD  [place] [address] [facility]  [address] [facility] [address]  Phone: [phone]  ______________________________________________________________________    Physical Exam   Vital Signs: Temp: 99.3  F (37.4  C) Temp src: Oral BP: 139/83 Pulse: 52   Resp: 16 SpO2: 97 % O2 Device: None (Room air)    Weight: 0 lbs 0 oz  See progress note       Primary Care Physician   John Lee    Discharge Orders      Follow Up ([facility])    Follow up with [place] [facility] in 1-2 weeks Please call [phone] to schedule an appointment.    [facility]  [address] [place] [address]  You may call [phone] with any questions or concerns.         Appointments on [place] and/or [facility] (with [facility] or [facility] provider or service). Call [phone] if you haven't heard regarding these appointments within 7 days of discharge.     Reason for your hospital stay    You were in the hospital  for kidney stone which you have passed.     Follow-up and recommended labs and tests     Follow up with primary care provider, John Lee, within 7 days for hospital follow- up.  The following labs/tests are recommended: Basic metabolic panel and complete blood count in 1 week.   Also recommend liver MRI to evaluate the abnormality noted on CT scan.  This can be arranged through your primary care doctor.     Activity    Your activity upon discharge: activity as tolerated     Diet    Follow this diet upon discharge: Orders Placed This Encounter      Regular Diet Adult       Significant Results and Procedures   Most Recent 3 CBC's:Recent Labs   Lab Test 06/04/23  0741 06/09/21  1133 09/14/17  1024   WBC 12.7* 4.6 4.6   HGB 14.2 14.8 15.5   MCV 94 88.2 90.3    262 252     Most Recent 3 BMP's:Recent Labs   Lab Test 06/04/23  0741 06/09/21  1115 12/06/19  0930    139 141   POTASSIUM 4.1 4.5 4.4   CHLORIDE 107 104 105   CO2 22  --   --    BUN 13.5 11  17 11  17   CR 1.20* 0.66* 0.65*   ANIONGAP 9  --   --    IDRIS 8.2* 9.0 9.4   * 91 97   , No results found for this or any previous visit.    Discharge Medications   Current Discharge Medication List      START taking these medications    Details   tamsulosin (FLOMAX) 0.4 MG capsule Take 1 capsule (0.4 mg) by mouth daily for 30 days  Qty: 30 capsule, Refills: 0    Comments: Future refills by PCP Dr. John Lee with phone number [phone].  Associated Diagnoses: Nephrolithiasis         CONTINUE these medications which have NOT CHANGED    Details   Vitamin D3 (VITAMIN D, CHOLECALCIFEROL,) 25 mcg (1000 units) tablet Take 1 tablet by mouth daily           Allergies   No Known Allergies

## 2023-06-04 NOTE — PHARMACY-ADMISSION MEDICATION HISTORY
Pharmacist Admission Medication History    Admission medication history is complete. The information provided in this note is only as accurate as the sources available at the time of the update.    Medication reconciliation/reorder completed by provider prior to medication history? No    Information Source(s): Patient and CareEverywhere/SureScripts via in-person    Pertinent Information: None    Changes made to PTA medication list:    Added: vitamin D3    Deleted: None    Changed: None    Medication Affordability:  Not including over the counter (OTC) medications, was there a time in the past 3 months when you did not take your medications as prescribed because of cost?: No    Allergies reviewed with patient and updates made in EHR: yes    Medication History Completed By: Stacey Castillo RPH 6/3/2023 7:08 PM    Prior to Admission medications    Medication Sig Last Dose Taking? Auth Provider Long Term End Date   Vitamin D3 (VITAMIN D, CHOLECALCIFEROL,) 25 mcg (1000 units) tablet Take 1 tablet by mouth daily 6/2/2023 Yes Unknown, Entered By History

## 2023-06-04 NOTE — PLAN OF CARE
Goal Outcome Evaluation:     DATE & TIME: 6/3/2023, [id number]    Cognitive Concerns/ Orientation : AOx 4, calm and cooperative    BEHAVIOR & AGGRESSION TOOL COLOR: Green   CIWA SCORE: NA    ABNL VS/O2: VSS on RA ex bradycardic, HR upper 40s-50s  MOBILITY: independent   PAIN MANAGMENT: c/o pain L. flank/back 7/10- managed with IV Dilaudid x 2  DIET: NPO @ [place]  BOWEL/BLADDER: BS active x 4, continent, passing flatus, no BM  ABNL LAB/BG:   DRAIN/DEVICES: PIV infusing NS @ 125 mL/hr,   TELEMETRY RHYTHM: SB   SKIN: WDL   TESTS/PROCEDURES: possible procedure today if stone does not pass. Urology to see patient in the am.   D/C DAY/GOALS/PLACE: pending clinical improvement   OTHER IMPORTANT INFO: Pt pleasant, denies N/V, c/o L flank pain-received Dilaudid IV x 2, up to BR voiding, urine strained/no stones passed, continues on Rocephin, Urology following.

## 2023-06-04 NOTE — CONSULTS
[facility] Inpatient Consultation Note    Terrence Arrington MRN# [id number]   Age: 58 year old YOB: 1965     Date of Admission: 6/3/2023    Reason for consult: Left distal stone        Requesting physician: Dr. Crockett                History of Present Illness:   Terrence Arrington is a pleasant 58 year old year old male who presented to urgency room on 6/3 for eval of left sided back pain.   Diagnosed with left 3 mm UVJ stone and admitted to  for pain control.     Creatinine 1.20.   UA with blood.   Started on flomax, IVF, pain control.    Spoke with RN this morning and reported minimal pain, rating 2/10.     H&P from  dated 6/3  Terrence Arrington is a 58 year old male who is pretty healthy presented to the urgency room in [place] with a left flank pain.  Patient started having this pain 2 days ago intermittently.  Last night it got worse and when he woke up this morning he was unable to bear with the pain so presented to the emergency room in [place].  Associate with nausea but no vomiting.  In the emergency room he was evaluated by Dr. Jennifer Cooper.  His vital signs showed blood pressure 113/72 pulse rate of 52 temperature 97.7  F afebrile respirate of 16 he was satting 97% on room air.  CBC showed WBC count elevated at 14.7 hemoglobin 17.4, platelet count normal at 213 K.  BMP showed sodium 138 potassium 3.8 chloride 103 bicarb 23 anion gap of 12 calcium 9 BUN 18 creatinine 1.21 lactate was normal at 1.5.  Urinalysis showed nitrites negative leukocyte esterase negative, RBC 3-5, WBCs 0-2 few bacteria present.  UA was negative for UTI.  To evaluate left flank pain CT scan of the abdomen pelvis without contrast was done and it showed there is an obstructing 3 mm stone in the left UVJ with mild upstream hydroureteronephrosis.  Small amount of left perinephric fluid consistent with forniceal rupture.  Small left renal calculi.  Intermittent hypodensities in the left hepatic lobe which may  represent focal fatty infiltration or masses.  Recommend nonemergent liver MRI if to further evaluate.  [facility] with Dr. Maldonado was contacted by ED physician.  Patient was transferred as direct admission to [facility] for urology evaluation and treatment patient appears comfortable on arrival to [facility].  Pain well controlled no nausea or vomiting currently he is resting comfortably in bed.           Past Medical History:     Past Medical History:   Diagnosis Date     Fracture of right foot     x2             Past Surgical History:     Past Surgical History:   Procedure Laterality Date     APPENDECTOMY       HERNIORRHAPHY INGUINAL CHILD BILATERAL       LASIK BILATERAL Bilateral      VASECTOMY               Social History:     Social History     Socioeconomic History     Marital status: Single     Spouse name: Not on file     Number of children: Not on file     Years of education: Not on file     Highest education level: Not on file   Occupational History     Not on file   Tobacco Use     Smoking status: Former     Types: Cigars     Quit date:      Years since quittin.4     Smokeless tobacco: Never     Tobacco comments:     Occasionallycigars   Vaping Use     Vaping status: Not on file   Substance and Sexual Activity     Alcohol use: Yes     Comment: Mostly socially     Drug use: Yes     Types: Marijuana     Sexual activity: Yes     Birth control/protection: Male Surgical   Other Topics Concern     Not on file   Social History Narrative    Living: , no kids but has 2 dogs    Work: Lyrically [employer] and [employer] in [facility]; also works at [employer]    Exercise: daily, calisthenics, running; 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    EtOH: beer, 12 per week    Drugs: occas MJ    Hobbies: banjo, improv comedy         Social Determinants of Health     Financial Resource Strain: Not on file   Food Insecurity: Not on file   Transportation Needs: Not on file   Physical Activity: Not on file   Stress: Not on file    Social Connections: Not on file   Intimate Partner Violence: Not on file   Housing Stability: Not on file             Family History:     Family History   Problem Relation Age of Onset     Leukemia Mother 56     Other - See Comments Father 90        Cardiac stents, Smoker     Cancer Maternal Grandmother      Dementia Paternal Grandmother 80     Colon Cancer No family hx of      Prostate Cancer No family hx of              Immunizations:     Immunization History   Administered Date(s) Administered     COVID-19 Bivalent 12+ (Pfizer) 09/27/2022     COVID-19 MONOVALENT 12+ (Pfizer) 03/12/2021, 04/02/2021     TD,PF 7+ (Tenivac) 06/09/2021     Tdap (Adult) Unspecified Formulation 08/04/2009             Allergies:   No Known Allergies          Medications:     Current Facility-Administered Medications   Medication     bisacodyl (DULCOLAX) suppository 10 mg     cefTRIAXone (ROCEPHIN) 1 g vial to attach to  mL bag for ADULTS or NS 50 mL bag for PEDS     HYDROmorphone (PF) (DILAUDID) injection 0.3 mg     lidocaine (LMX4) cream     lidocaine 1 % 0.1-1 mL     melatonin tablet 1 mg     naloxone (NARCAN) injection 0.2 mg    Or     naloxone (NARCAN) injection 0.4 mg    Or     naloxone (NARCAN) injection 0.2 mg    Or     naloxone (NARCAN) injection 0.4 mg     ondansetron (ZOFRAN ODT) ODT tab 4 mg    Or     ondansetron (ZOFRAN) injection 4 mg     oxyCODONE (ROXICODONE) tablet 5 mg     prochlorperazine (COMPAZINE) injection 10 mg    Or     prochlorperazine (COMPAZINE) tablet 10 mg    Or     prochlorperazine (COMPAZINE) suppository 25 mg     senna-docusate (SENOKOT-S/PERICOLACE) 8.6-50 MG per tablet 1 tablet    Or     senna-docusate (SENOKOT-S/PERICOLACE) 8.6-50 MG per tablet 2 tablet     sodium chloride (PF) 0.9% PF flush 3 mL     sodium chloride (PF) 0.9% PF flush 3 mL     sodium chloride 0.9% infusion     tamsulosin (FLOMAX) capsule 0.4 mg             Review of Systems:   Comprehensive review of systems from the Admission  note dated 6/3 at [facility] was reviewed with no changes except per HPI.     Examination:  /83 (BP Location: Right arm, Patient Position: Semi-Santamaria's, Cuff Size: Adult Regular)   Pulse 52   Temp 99.3  F (37.4  C) (Oral)   Resp 16   SpO2 97%             Data:     Lab Results   Component Value Date    WBC 12.7 06/04/2023    WBC 4.6 06/09/2021     Lab Results   Component Value Date    RBC 4.49 06/04/2023    RBC 4.77 06/09/2021     Lab Results   Component Value Date    HGB 14.2 06/04/2023    HGB 14.8 06/09/2021     Lab Results   Component Value Date    HCT 42.2 06/04/2023    HCT 42.1 06/09/2021     Lab Results   Component Value Date     06/04/2023     06/09/2021     Creatinine   Date Value Ref Range Status   06/04/2023 1.20 (H) 0.67 - 1.17 mg/dL Final   06/09/2021 0.66 (L) 0.76 - 1.27 mg/dL Final   ]  Lab Results   Component Value Date    BUN 13.5 06/04/2023    BUN 11 06/09/2021    BUN 17 06/09/2021       Imaging:  CT stone protocol dated 6/3 at outside facility - unable to independently review image   There is an obstructing 3 mm stone in the distal left ureter at the UVJ with mild upstream hydroureteronephrosis. There is mild left perinephric fluid consistent with forniceal rupture    Assessment/Plan:  58 year old male with right flank pain found to have 3 mm right UVJ stone.      - Pain free this am.    - Flomax.    - Strain urine.    - Encourage oral intake.    - Monitor pain.    - Follow up with urology in 1-2 weeks. AVS updated.     I have discussed the patient with attending physician, Dr. Rayo, Urology  Margaret Guillory PA-C  [facility]

## 2023-06-05 ENCOUNTER — PATIENT OUTREACH (OUTPATIENT)
Dept: FAMILY MEDICINE | Facility: CLINIC | Age: 58
End: 2023-06-05

## 2023-06-05 LAB — BACTERIA UR CULT: NO GROWTH

## 2023-06-05 NOTE — TELEPHONE ENCOUNTER
Social Work Care Coordination initial contact:      Reason for referral: Hospital follow up    Medical concerns:  Left sided UVJ junction nephrolithiasis with mild hydronephrosis  Possible forniceal rupture;  Asymptomatic sinus bradycardia  Chronic tobacco use  Liver lesions-incidental finding    Behavioral concerns: None noted    Psychosocial Concerns:  PC to pt post hospital discharge for Kidney stone. While in hospital, a CT of abdomen shows focal fatty infiltration of liver. MRI scan recommended when he schedules follow up with PCP. [facility] reviewed AVS and encouraged pt to follow up with Urology . [facility] scheduled follow up with PCP on 6/16.  Pt reports feeling better however has some sensitivity in the area.       Immediate needs: follow up with Urology    Questions pertaining to care plan: NA      Resources shared: NA      Plan: Pt to schedule follow up with Urology, attend appt with pcp on 6/16.    LOYDA Bryan  , Care Coordination  [facility]  [phone]  [email or web address]

## 2023-06-05 NOTE — TELEPHONE ENCOUNTER
Voicemail      [facility] called patient    Left a voicemail for a call back.      [facility] will outreach again in 1-2 days    LOYDA Bryan  , Care Coordination  [facility]  [phone]  [email or web address]

## 2023-06-06 LAB
ATRIAL RATE - MUSE: 52 BPM
DIASTOLIC BLOOD PRESSURE - MUSE: NORMAL MMHG
INTERPRETATION ECG - MUSE: NORMAL
P AXIS - MUSE: 50 DEGREES
PR INTERVAL - MUSE: 176 MS
QRS DURATION - MUSE: 82 MS
QT - MUSE: 444 MS
QTC - MUSE: 412 MS
R AXIS - MUSE: -15 DEGREES
SYSTOLIC BLOOD PRESSURE - MUSE: NORMAL MMHG
T AXIS - MUSE: 36 DEGREES
VENTRICULAR RATE- MUSE: 52 BPM

## 2023-06-16 ENCOUNTER — OFFICE VISIT (OUTPATIENT)
Dept: FAMILY MEDICINE | Facility: CLINIC | Age: 58
End: 2023-06-16

## 2023-06-16 VITALS
HEART RATE: 62 BPM | BODY MASS INDEX: 27.78 KG/M2 | OXYGEN SATURATION: 97 % | WEIGHT: 177 LBS | DIASTOLIC BLOOD PRESSURE: 66 MMHG | HEIGHT: 67 IN | SYSTOLIC BLOOD PRESSURE: 104 MMHG

## 2023-06-16 DIAGNOSIS — R79.89 ELEVATED SERUM CREATININE: ICD-10-CM

## 2023-06-16 DIAGNOSIS — K76.9 LIVER LESION: ICD-10-CM

## 2023-06-16 DIAGNOSIS — N20.0 NEPHROLITHIASIS: Primary | ICD-10-CM

## 2023-06-16 DIAGNOSIS — D72.829 LEUKOCYTOSIS, UNSPECIFIED TYPE: ICD-10-CM

## 2023-06-16 LAB
% GRANULOCYTES: 70 % (ref 42.2–75.2)
HCT VFR BLD AUTO: 42.3 % (ref 39–51)
HEMOGLOBIN: 14.4 G/DL (ref 13.4–17.5)
LYMPHOCYTES NFR BLD AUTO: 23.7 % (ref 20.5–51.1)
MCH RBC QN AUTO: 31.1 PG (ref 27–31)
MCHC RBC AUTO-ENTMCNC: 34.1 G/DL (ref 33–37)
MCV RBC AUTO: 91.3 FL (ref 80–100)
MONOCYTES NFR BLD AUTO: 6.3 % (ref 1.7–9.3)
PLATELET # BLD AUTO: 339 K/UL (ref 140–450)
RBC # BLD AUTO: 4.63 X10/CMM (ref 4.2–5.9)
WBC # BLD AUTO: 8.4 X10/CMM (ref 3.8–11)

## 2023-06-16 PROCEDURE — 99495 TRANSJ CARE MGMT MOD F2F 14D: CPT | Mod: 25 | Performed by: FAMILY MEDICINE

## 2023-06-16 PROCEDURE — 85025 COMPLETE CBC W/AUTO DIFF WBC: CPT | Performed by: FAMILY MEDICINE

## 2023-06-16 NOTE — LETTER
June 21, 2023      Terrence Arrington  [address] [facility] [address]        Dear Terrence,     Your kidney function and electrolytes (blood salts) are normal.  Your kidneys have recovered well.     It was very nice seeing you.  If you have any questions, please contact our office.         Sincerely,     John Lee MD    Resulted Orders   Basic Metabolic Panel (8) ([facility])   Result Value Ref Range    Glucose 82 70 - 99 mg/dL    Urea Nitrogen 16 6 - 24 mg/dL    Creatinine 0.66 (L) 0.76 - 1.27 mg/dL    eGFR  108 >59 mL/min/1.73    BUN/Creatinine Ratio 24 (H) 9 - 20    Sodium 141 134 - 144 mmol/L    Potassium 5.0 3.5 - 5.2 mmol/L    Chloride 104 96 - 106 mmol/L    Total CO2 24 20 - 29 mmol/L    Calcium 9.5 8.7 - 10.2 mg/dL    Narrative    Performed at:  01 - [facility] [address], [place], [place]  [id number]  : Mark Swan MD, Phone:  [phone]

## 2023-06-16 NOTE — PROGRESS NOTES
"  Hospital Follow-up Visit:    Hospital/Nursing Home/IP Rehab Facility: [facility]  Date of Admission: 6/3/2023  Date of Discharge: 6/4/2023  Reason(s) for Admission:   Left sided UVJ junction nephrolithiasis with mild hydronephrosis  Possible forniceal rupture  Liver lesions-incidental finding    Was your hospitalization related to COVID-19? No   Problems taking medications regularly:  None  Medication changes since discharge: None  Problems adhering to non-medication therapy:  None    Summary of hospitalization:  [facility] discharge summary reviewed  Diagnostic Tests/Treatments reviewed.  Follow up needed: urology, MRI  Other Healthcare Providers Involved in Patient s Care:         None  Update since discharge: improved.   Plan of care communicated with patient       Subjective     Terrence is a 58 year old patient who presents to clinic for hospital follow up.      Admitted with left UVJ stone with mild hydronephrosis.  He passed the stone prior to discharge and is feeling better.  UCx negative.  Cr and WBC mildly elevated in the hospital.  He is improved and has urology follow up.    Incidental liver lesions noted on CT.  They are indeterminate and MRI is recommended.    Review of Systems   Constitutional, HEENT, cardiovascular, pulmonary, GI, , musculoskeletal, neuro, skin, endocrine and psych systems are negative, except as otherwise noted.      Objective    /66   Pulse 62   Ht 1.702 m (5' 7\")   Wt 80.3 kg (177 lb)   SpO2 97%   BMI 27.72 kg/m      General: Well appearing, NAD  Psych: normal mood and affect        No results found for any visits on 06/16/23.  No results found for this or any previous visit (from the past 24 hour(s)).    Nephrolithiasis  Resolved, urology follow up    Elevated serum creatinine  recheck  - Basic Metabolic Panel (8) ([facility])    Leukocytosis, unspecified type  recheck  - CBC with Diff/Plt ([facility])    Liver lesion  MRI ordered  - " Radiology Referral; Future    Follow up in 4-6 weeks for CPE

## 2023-06-17 LAB
BUN SERPL-MCNC: 16 MG/DL (ref 6–24)
BUN/CREATININE RATIO: 24 (ref 9–20)
CALCIUM SERPL-MCNC: 9.5 MG/DL (ref 8.7–10.2)
CHLORIDE SERPLBLD-SCNC: 104 MMOL/L (ref 96–106)
CREAT SERPL-MCNC: 0.66 MG/DL (ref 0.76–1.27)
EGFR: 108 ML/MIN/1.73
GLUCOSE SERPL-MCNC: 82 MG/DL (ref 70–99)
POTASSIUM SERPL-SCNC: 5 MMOL/L (ref 3.5–5.2)
SODIUM SERPL-SCNC: 141 MMOL/L (ref 134–144)
TOTAL CO2: 24 MMOL/L (ref 20–29)

## 2023-06-30 ENCOUNTER — TRANSFERRED RECORDS (OUTPATIENT)
Dept: FAMILY MEDICINE | Facility: CLINIC | Age: 58
End: 2023-06-30

## 2023-10-07 ENCOUNTER — HEALTH MAINTENANCE LETTER (OUTPATIENT)
Age: 58
End: 2023-10-07

## 2024-11-30 ENCOUNTER — HEALTH MAINTENANCE LETTER (OUTPATIENT)
Age: 59
End: 2024-11-30